# Patient Record
Sex: MALE
[De-identification: names, ages, dates, MRNs, and addresses within clinical notes are randomized per-mention and may not be internally consistent; named-entity substitution may affect disease eponyms.]

---

## 2023-06-14 ENCOUNTER — TRANSCRIPTION ENCOUNTER (OUTPATIENT)
Age: 63
End: 2023-06-14

## 2023-06-14 ENCOUNTER — INPATIENT (INPATIENT)
Facility: HOSPITAL | Age: 63
LOS: 3 days | Discharge: HOME | DRG: 64 | End: 2023-06-18
Attending: NEUROLOGICAL SURGERY | Admitting: NEUROLOGICAL SURGERY
Payer: MEDICARE

## 2023-06-14 VITALS
TEMPERATURE: 98 F | SYSTOLIC BLOOD PRESSURE: 144 MMHG | RESPIRATION RATE: 18 BRPM | OXYGEN SATURATION: 98 % | DIASTOLIC BLOOD PRESSURE: 96 MMHG | WEIGHT: 139.99 LBS | HEART RATE: 89 BPM

## 2023-06-14 DIAGNOSIS — I60.9 NONTRAUMATIC SUBARACHNOID HEMORRHAGE, UNSPECIFIED: ICD-10-CM

## 2023-06-14 LAB
ALBUMIN SERPL ELPH-MCNC: 4.3 G/DL — SIGNIFICANT CHANGE UP (ref 3.3–5.2)
ALP SERPL-CCNC: 88 U/L — SIGNIFICANT CHANGE UP (ref 40–120)
ALT FLD-CCNC: 34 U/L — SIGNIFICANT CHANGE UP
ANION GAP SERPL CALC-SCNC: 11 MMOL/L — SIGNIFICANT CHANGE UP (ref 5–17)
APPEARANCE UR: CLEAR — SIGNIFICANT CHANGE UP
APTT BLD: 33.8 SEC — SIGNIFICANT CHANGE UP (ref 27.5–35.5)
AST SERPL-CCNC: 30 U/L — SIGNIFICANT CHANGE UP
BASOPHILS # BLD AUTO: 0.06 K/UL — SIGNIFICANT CHANGE UP (ref 0–0.2)
BASOPHILS NFR BLD AUTO: 1.1 % — SIGNIFICANT CHANGE UP (ref 0–2)
BILIRUB SERPL-MCNC: 0.3 MG/DL — LOW (ref 0.4–2)
BILIRUB UR-MCNC: NEGATIVE — SIGNIFICANT CHANGE UP
BLD GP AB SCN SERPL QL: SIGNIFICANT CHANGE UP
BUN SERPL-MCNC: 21.1 MG/DL — HIGH (ref 8–20)
CALCIUM SERPL-MCNC: 9.1 MG/DL — SIGNIFICANT CHANGE UP (ref 8.4–10.5)
CHLORIDE SERPL-SCNC: 105 MMOL/L — SIGNIFICANT CHANGE UP (ref 96–108)
CHOLEST SERPL-MCNC: 238 MG/DL — HIGH
CO2 SERPL-SCNC: 26 MMOL/L — SIGNIFICANT CHANGE UP (ref 22–29)
COLOR SPEC: YELLOW — SIGNIFICANT CHANGE UP
CREAT ?TM UR-MCNC: 173 MG/DL — SIGNIFICANT CHANGE UP
CREAT SERPL-MCNC: 1.57 MG/DL — HIGH (ref 0.5–1.3)
DIFF PNL FLD: NEGATIVE — SIGNIFICANT CHANGE UP
EGFR: 49 ML/MIN/1.73M2 — LOW
EOSINOPHIL # BLD AUTO: 0.43 K/UL — SIGNIFICANT CHANGE UP (ref 0–0.5)
EOSINOPHIL NFR BLD AUTO: 7.9 % — HIGH (ref 0–6)
GLUCOSE SERPL-MCNC: 102 MG/DL — HIGH (ref 70–99)
GLUCOSE UR QL: NEGATIVE MG/DL — SIGNIFICANT CHANGE UP
HCT VFR BLD CALC: 45.3 % — SIGNIFICANT CHANGE UP (ref 39–50)
HDLC SERPL-MCNC: 50 MG/DL — SIGNIFICANT CHANGE UP
HGB BLD-MCNC: 15.7 G/DL — SIGNIFICANT CHANGE UP (ref 13–17)
IMM GRANULOCYTES NFR BLD AUTO: 0.6 % — SIGNIFICANT CHANGE UP (ref 0–0.9)
INR BLD: 0.98 RATIO — SIGNIFICANT CHANGE UP (ref 0.88–1.16)
KETONES UR-MCNC: NEGATIVE — SIGNIFICANT CHANGE UP
LEUKOCYTE ESTERASE UR-ACNC: NEGATIVE — SIGNIFICANT CHANGE UP
LIPID PNL WITH DIRECT LDL SERPL: 126 MG/DL — HIGH
LYMPHOCYTES # BLD AUTO: 1.6 K/UL — SIGNIFICANT CHANGE UP (ref 1–3.3)
LYMPHOCYTES # BLD AUTO: 29.5 % — SIGNIFICANT CHANGE UP (ref 13–44)
MAGNESIUM SERPL-MCNC: 2.1 MG/DL — SIGNIFICANT CHANGE UP (ref 1.8–2.6)
MAGNESIUM SERPL-MCNC: 2.2 MG/DL — SIGNIFICANT CHANGE UP (ref 1.6–2.6)
MCHC RBC-ENTMCNC: 30.3 PG — SIGNIFICANT CHANGE UP (ref 27–34)
MCHC RBC-ENTMCNC: 34.7 GM/DL — SIGNIFICANT CHANGE UP (ref 32–36)
MCV RBC AUTO: 87.5 FL — SIGNIFICANT CHANGE UP (ref 80–100)
MONOCYTES # BLD AUTO: 0.44 K/UL — SIGNIFICANT CHANGE UP (ref 0–0.9)
MONOCYTES NFR BLD AUTO: 8.1 % — SIGNIFICANT CHANGE UP (ref 2–14)
NEUTROPHILS # BLD AUTO: 2.86 K/UL — SIGNIFICANT CHANGE UP (ref 1.8–7.4)
NEUTROPHILS NFR BLD AUTO: 52.8 % — SIGNIFICANT CHANGE UP (ref 43–77)
NITRITE UR-MCNC: NEGATIVE — SIGNIFICANT CHANGE UP
NON HDL CHOLESTEROL: 188 MG/DL — HIGH
OSMOLALITY UR: 632 MOSM/KG — SIGNIFICANT CHANGE UP (ref 300–1000)
PH UR: 6 — SIGNIFICANT CHANGE UP (ref 5–8)
PHOSPHATE SERPL-MCNC: 1.8 MG/DL — LOW (ref 2.4–4.7)
PLATELET # BLD AUTO: 215 K/UL — SIGNIFICANT CHANGE UP (ref 150–400)
POTASSIUM SERPL-MCNC: 4.6 MMOL/L — SIGNIFICANT CHANGE UP (ref 3.5–5.3)
POTASSIUM SERPL-SCNC: 4.6 MMOL/L — SIGNIFICANT CHANGE UP (ref 3.5–5.3)
PROT SERPL-MCNC: 7.1 G/DL — SIGNIFICANT CHANGE UP (ref 6.6–8.7)
PROT UR-MCNC: NEGATIVE — SIGNIFICANT CHANGE UP
PROTHROM AB SERPL-ACNC: 11.4 SEC — SIGNIFICANT CHANGE UP (ref 10.5–13.4)
RBC # BLD: 5.18 M/UL — SIGNIFICANT CHANGE UP (ref 4.2–5.8)
RBC # FLD: 11.9 % — SIGNIFICANT CHANGE UP (ref 10.3–14.5)
SODIUM SERPL-SCNC: 142 MMOL/L — SIGNIFICANT CHANGE UP (ref 135–145)
SODIUM UR-SCNC: 110 MMOL/L — SIGNIFICANT CHANGE UP
SP GR SPEC: 1.01 — SIGNIFICANT CHANGE UP (ref 1.01–1.02)
TRIGL SERPL-MCNC: 309 MG/DL — HIGH
UROBILINOGEN FLD QL: NEGATIVE MG/DL — SIGNIFICANT CHANGE UP
WBC # BLD: 5.42 K/UL — SIGNIFICANT CHANGE UP (ref 3.8–10.5)
WBC # FLD AUTO: 5.42 K/UL — SIGNIFICANT CHANGE UP (ref 3.8–10.5)

## 2023-06-14 PROCEDURE — 99291 CRITICAL CARE FIRST HOUR: CPT

## 2023-06-14 PROCEDURE — 76775 US EXAM ABDO BACK WALL LIM: CPT | Mod: 26

## 2023-06-14 PROCEDURE — 99222 1ST HOSP IP/OBS MODERATE 55: CPT

## 2023-06-14 PROCEDURE — 93010 ELECTROCARDIOGRAM REPORT: CPT

## 2023-06-14 PROCEDURE — 70450 CT HEAD/BRAIN W/O DYE: CPT | Mod: 26,MA

## 2023-06-14 PROCEDURE — 71045 X-RAY EXAM CHEST 1 VIEW: CPT | Mod: 26

## 2023-06-14 PROCEDURE — 93306 TTE W/DOPPLER COMPLETE: CPT | Mod: 26

## 2023-06-14 PROCEDURE — 93970 EXTREMITY STUDY: CPT | Mod: 26

## 2023-06-14 RX ORDER — ACETAMINOPHEN 500 MG
1000 TABLET ORAL ONCE
Refills: 0 | Status: COMPLETED | OUTPATIENT
Start: 2023-06-14 | End: 2023-06-14

## 2023-06-14 RX ORDER — NICARDIPINE HYDROCHLORIDE 30 MG/1
5 CAPSULE, EXTENDED RELEASE ORAL
Qty: 40 | Refills: 0 | Status: DISCONTINUED | OUTPATIENT
Start: 2023-06-14 | End: 2023-06-14

## 2023-06-14 RX ORDER — PANTOPRAZOLE SODIUM 20 MG/1
40 TABLET, DELAYED RELEASE ORAL
Refills: 0 | Status: DISCONTINUED | OUTPATIENT
Start: 2023-06-14 | End: 2023-06-15

## 2023-06-14 RX ORDER — HYDRALAZINE HCL 50 MG
10 TABLET ORAL EVERY 4 HOURS
Refills: 0 | Status: DISCONTINUED | OUTPATIENT
Start: 2023-06-14 | End: 2023-06-14

## 2023-06-14 RX ORDER — AMLODIPINE BESYLATE 2.5 MG/1
10 TABLET ORAL DAILY
Refills: 0 | Status: DISCONTINUED | OUTPATIENT
Start: 2023-06-14 | End: 2023-06-15

## 2023-06-14 RX ORDER — SODIUM CHLORIDE 9 MG/ML
1000 INJECTION INTRAMUSCULAR; INTRAVENOUS; SUBCUTANEOUS
Refills: 0 | Status: DISCONTINUED | OUTPATIENT
Start: 2023-06-14 | End: 2023-06-14

## 2023-06-14 RX ORDER — ACETAMINOPHEN 500 MG
650 TABLET ORAL EVERY 6 HOURS
Refills: 0 | Status: DISCONTINUED | OUTPATIENT
Start: 2023-06-14 | End: 2023-06-18

## 2023-06-14 RX ORDER — LEVETIRACETAM 250 MG/1
500 TABLET, FILM COATED ORAL EVERY 12 HOURS
Refills: 0 | Status: DISCONTINUED | OUTPATIENT
Start: 2023-06-14 | End: 2023-06-18

## 2023-06-14 RX ORDER — SODIUM,POTASSIUM PHOSPHATES 278-250MG
1 POWDER IN PACKET (EA) ORAL ONCE
Refills: 0 | Status: COMPLETED | OUTPATIENT
Start: 2023-06-14 | End: 2023-06-14

## 2023-06-14 RX ORDER — SODIUM CHLORIDE 9 MG/ML
1000 INJECTION, SOLUTION INTRAVENOUS
Refills: 0 | Status: DISCONTINUED | OUTPATIENT
Start: 2023-06-14 | End: 2023-06-15

## 2023-06-14 RX ORDER — CHLORHEXIDINE GLUCONATE 213 G/1000ML
1 SOLUTION TOPICAL DAILY
Refills: 0 | Status: DISCONTINUED | OUTPATIENT
Start: 2023-06-14 | End: 2023-06-18

## 2023-06-14 RX ORDER — LABETALOL HCL 100 MG
10 TABLET ORAL
Refills: 0 | Status: DISCONTINUED | OUTPATIENT
Start: 2023-06-14 | End: 2023-06-18

## 2023-06-14 RX ORDER — HYDRALAZINE HCL 50 MG
10 TABLET ORAL
Refills: 0 | Status: DISCONTINUED | OUTPATIENT
Start: 2023-06-14 | End: 2023-06-16

## 2023-06-14 RX ORDER — ONDANSETRON 8 MG/1
4 TABLET, FILM COATED ORAL EVERY 6 HOURS
Refills: 0 | Status: DISCONTINUED | OUTPATIENT
Start: 2023-06-14 | End: 2023-06-16

## 2023-06-14 RX ORDER — NICARDIPINE HYDROCHLORIDE 30 MG/1
5 CAPSULE, EXTENDED RELEASE ORAL
Qty: 40 | Refills: 0 | Status: DISCONTINUED | OUTPATIENT
Start: 2023-06-14 | End: 2023-06-17

## 2023-06-14 RX ADMIN — Medication 10 MILLIGRAM(S): at 16:56

## 2023-06-14 RX ADMIN — SODIUM CHLORIDE 50 MILLILITER(S): 9 INJECTION, SOLUTION INTRAVENOUS at 19:53

## 2023-06-14 RX ADMIN — LEVETIRACETAM 400 MILLIGRAM(S): 250 TABLET, FILM COATED ORAL at 18:01

## 2023-06-14 RX ADMIN — Medication 1000 MILLIGRAM(S): at 18:13

## 2023-06-14 RX ADMIN — CHLORHEXIDINE GLUCONATE 1 APPLICATION(S): 213 SOLUTION TOPICAL at 18:03

## 2023-06-14 RX ADMIN — SODIUM CHLORIDE 50 MILLILITER(S): 9 INJECTION INTRAMUSCULAR; INTRAVENOUS; SUBCUTANEOUS at 18:01

## 2023-06-14 RX ADMIN — Medication 1 TABLET(S): at 21:47

## 2023-06-14 RX ADMIN — NICARDIPINE HYDROCHLORIDE 25 MG/HR: 30 CAPSULE, EXTENDED RELEASE ORAL at 15:06

## 2023-06-14 RX ADMIN — Medication 400 MILLIGRAM(S): at 17:15

## 2023-06-14 RX ADMIN — NICARDIPINE HYDROCHLORIDE 25 MG/HR: 30 CAPSULE, EXTENDED RELEASE ORAL at 18:01

## 2023-06-14 NOTE — ED PROVIDER NOTE - OBJECTIVE STATEMENT
62 yo male with no known medical hx presents to the ED for headache. Patient began having right frontal region 5 days ago. Denies N/V, weakness, loss of sensation, loss of balance. Patient was sent for MRI today out patient and report states he has a subarachnoid hemorrhage.

## 2023-06-14 NOTE — H&P ADULT - NSHPREVIEWOFSYSTEMS_GEN_ALL_CORE
Constitutional:, no fevers, chills, or new weakness  Eyes: No double vision, no change in visual acuity, no blurry vision, no occular discharge  Neurologic: + headache, No new weakness, no seizure reported  Ears, nose, mouth throat:  No ottorrhea. No change in hearing, No anosmia, No oral lesions, No sore throat  Cardiovascular: No palpitations, no chest pain, no nocturnal or positional dyspnea.  Respiratory: No shortness of breath, No Cough  Gastrointestinal: No change in bowel habits, no change in appetite  Genitourinary: No Frequency, No Dysuria, no Hematuria  Musculoskeletal: No muscle wasting, No new weakness  Psych: No changes in mood, Denies drug use, Denies history of psyciatric illness  Integumentary: Denies new skin lesions  Endocrine: Denies history of DM, denies history of thyroid disease, No heat or cold intolerance  Heme/Lymph: No use of antiplatelet/anticoagulant  Allergic / Immune: No active allergies unless otherwise specified in medical chart    All other systems reviewed and are negative

## 2023-06-14 NOTE — H&P ADULT - ASSESSMENT
63M with atraumatic SAH     Plan   - Admit to stepdown   - q2 neuro checks   - CTA/CTV head and neck   - Possible angiogram   - -140   - Okay for diet once passes dysphagia   - SCDS only for dvt ppx

## 2023-06-14 NOTE — CONSULT NOTE ADULT - ASSESSMENT
63M has had 1 week of headaches. The headaches are constant, they are worse when he is laying down. His PCP sent him for an MRI, MRA for a headache work up which revealed a right occipital region hemorrhage with surrounding vasogenic edema and subarachnoid blood. Pt directed to ED for further eval. ED got CT scan that shows right parietal SAH. HH1, MF 0. MRS 0 (14 Jun 2023 15:23) In Ed placed on nicardipine.     Neuro:   -q1 neurochecks  -Keppra 500 BID  - [facility]   -Preop for angio tomorrow  - pending CTA, CTV   - 6 hours Repeat ct head.   BP: 100-140   - cardene   - PRN: labetolol    Resp: RA   satting well     GI:   regular diet  - Protonix 40,   PRN: zofran     :   - voiding   NS @ 50  - Creatinine increasing   - renal US     Heme:   SCD    ID:   afebrile  63M has had 1 week of headaches. The headaches are constant, they are worse when he is laying down. His PCP sent him for an MRI, MRA for a headache work up which revealed a right occipital region hemorrhage with surrounding vasogenic edema and subarachnoid blood. Pt directed to ED for further eval. ED got CT scan that shows right parietal SAH. HH1, MF 0. MRS 0 (14 Jun 2023 15:23) In Ed placed on nicardipine.     Neuro:   -q1 neurochecks/q1 vital checks.   -Keppra 500 BID  - admit to [facility]   -Preop for angio tomorrow  - pending CTA, CTV   - 6 hours Repeat ct head.   BP: 100-140   - cardene   - PRN: labetolol    Resp: RA   satting well     GI:   regular diet  - Protonix 40: for dypepsia   PRN: zofran     :   - voiding   NS @ 50  - Creatinine increasing   - renal US   -UA , Ur Creatinine, Urine sodium, Ur urea    Heme:   SCD    ID:   afebrile

## 2023-06-14 NOTE — H&P ADULT - HISTORY OF PRESENT ILLNESS
63M has had 1 week of headaches. The headaches are constant, they are worse when he is laying down. His PCP sent him for an MRI, MRA for a headache work up which revealed a right occipital region hemorrhage with surrounding vasogenic edema and subarachnoid blood. Pt directed to ED for further eval. ED got CT scan that shows right parietal SAH. HH1, MF 0. MRS 0 63M has had 1 week of headaches. The headaches are constant, they are worse when he is laying down. His PCP sent him for an MRI, MRA for a headache work up which revealed a right occipital region hemorrhage with surrounding vasogenic edema and subarachnoid blood. Pt directed to ED for further eval. ED got CT scan that shows right parietal SAH. HH1, MF 0. MRS 0. ICH 0

## 2023-06-14 NOTE — ED ADULT NURSE NOTE - OBJECTIVE STATEMENT
patient present to ED reporting a headache since Friday 6/9; daughter who is patients PCP ordered an MRI & MRA. Test was completed today 6/14 and after daughter received results sent patient to ED for what she reported to be a bleed. patient arrived to ED alert and oriented no neuro defects noted, conversational and calm. NSR on cardiac.

## 2023-06-14 NOTE — ED ADULT TRIAGE NOTE - NS ED NURSE AMBULANCES
[facility]

## 2023-06-14 NOTE — ED ADULT TRIAGE NOTE - CHIEF COMPLAINT QUOTE
Pt presents to ED for occipital bleed after oupt MRI. R sided HA x 1 week. Neuro intact.  MD Pitt at bedside.

## 2023-06-14 NOTE — CONSULT NOTE ADULT - CRITICAL CARE ATTENDING COMMENT
64 yo M with HTN, HLD, presented with R subacute infarct with associated cortical petechial hemorrhage.  Hypertensive urgency that required initiation of Cardene ggt.  CHANO vs CKD.    Plan:  admitted to NSICU, neurochecks  CTA/CTV, plan for angio in am  maintain -160, Cardene ggt for now  cont IV hydration with Plasmalyte  renal sono, urine studies (AU, ur osm, Na, urea)  stroke core meaures  rest as above      Pt is critically ill and at high risk of rapid deterioration/death due to abovementioned conditions.   Still requires critical care interventions - ongoing frequent evaluations, interventions and management adjustment by the Attending and ICU team, - and included review of relevant history, clinical examination, review of data and images, discussion of treatment with the multidisciplinary team and any consultants involved in this patient’s care as well as family discussion. 62 yo M with HTN, HLD, presented with R subacute infarct with associated cortical petechial hemorrhage.  Hypertensive urgency that required initiation of Cardene ggt.  CHANO vs CKD.    Plan:  admitted to NSICU, neurochecks  CTA/CTV, plan for angio in am  maintain -160, Cardene ggt for now  cont IV hydration with Plasmalyte  renal sono, urine studies (AU, ur osm, Na, urea)  stroke core meaures  rest as above      Pt is critically ill and at high risk of rapid deterioration/death due to abovementioned conditions.   Still requires critical care interventions - ongoing frequent evaluations, interventions and management adjustment by the Attending and ICU team, - and included review of relevant history, clinical examination, review of data and images, discussion of treatment with the multidisciplinary team and any consultants involved in this patient’s care as well as family discussion.

## 2023-06-14 NOTE — H&P ADULT - NSHPPHYSICALEXAM_GEN_ALL_CORE
Physical Exam:     Constitutional:  NAD  	Neuro  	* Mental Status:  GCS 15: E4, V5, M6. Awake, alert, oriented to conversation. No aphasia   	* Cranial Nerves: Cnii-Cnxii grossly intact. PERRL, EOMI, tongue midline, no gaze deviation. No field cut  	* Motor: RUE 5/5, LUE 5/5, RLE 5/5, LLE 5/5, no drift or dysmetria  	* Sensory: Sensation intact to light touch  	* Reflexes: Not assessed  	* Gait: Not assessed    	Cardiovascular:   Regular rate and rhythm.  	Eyes: See neurologic examination with detailed examination of eyes.  	ENT: No JVD, Trachea Midline.  	Respiratory: Clear to auscultation.  	Gastrointestinal: Soft, nontender, nondistended.  	Genitourinary: Male  	Musculoskeletal: No muscle wasting noted, (See neuorlogic assessment for full muscle strength assessment) No pretibial edema appreciated, no appreciable calf tenderness.  	Skin:  no skin breakdown  	Musculoskeletal: See detailed muscle strength examination, listed under neurologic examination.  Hematologic / Lymph / Immunologic: No bleeding from IV sites or wounds, No lymphadenopathy, No HIves or allergic type skin lesions

## 2023-06-14 NOTE — CONSULT NOTE ADULT - SUBJECTIVE AND OBJECTIVE BOX
[Dear  ___] : Dear  [unfilled], [Consult Letter:] : I had the pleasure of evaluating your patient, [unfilled]. [FreeTextEntry1] : Below is my note regarding the office visit today.\par \par Thank you so very much for allowing me to participate in LIBBY's care.  Please don't hesitate to call me should any questions or issues arise regarding LIBBY.\par \par Sincerely, \par \par Sameer\par \par Sameer Jansen MD, FACS, FSPU\par Chief, Pediatric Urology\par Professor of Urology and Pediatrics\par [employer]  HPI:  HPI:  63M has had 1 week of headaches. The headaches are constant, they are worse when he is laying down. His PCP sent him for an MRI, MRA for a headache work up which revealed a right occipital region hemorrhage with surrounding vasogenic edema and subarachnoid blood. Pt directed to ED for further eval. ED got CT scan that shows right parietal SAH. HH1, MF 0. MRS 0 (14 Jun 2023 15:23)      Vital Signs Last 24 Hrs  T(C): 36.7 (14 Jun 2023 14:44), Max: 36.8 (14 Jun 2023 14:11)  T(F): 98 (14 Jun 2023 14:44), Max: 98.3 (14 Jun 2023 14:11)  HR: 60 (14 Jun 2023 17:11) (60 - 90)  BP: 118/62 (14 Jun 2023 17:11) (118/62 - 185/90)  BP(mean): --  RR: 16 (14 Jun 2023 17:11) (16 - 18)  SpO2: 93% (14 Jun 2023 17:11) (93% - 98%)    Parameters below as of 14 Jun 2023 17:01  Patient On (Oxygen Delivery Method): room air      Physical Exam: Physical Exam:     Constitutional:  NAD  	Neuro  	* Mental Status:  GCS 15: E4, V5, M6. Awake, alert, oriented to conversation. No aphasia   	* Cranial Nerves: Cnii-Cnxii grossly intact. PERRL, EOMI, tongue midline, no gaze deviation. No field cut  	* Motor: RUE 5/5, LUE 5/5, RLE 5/5, LLE 5/5, no drift or dysmetria  	* Sensory: Sensation intact to light touch  	* Reflexes: Not assessed  	* Gait: Not assessed    	Cardiovascular:   Regular rate and rhythm.  	Eyes: See neurologic examination with detailed examination of eyes.  	ENT: No JVD, Trachea Midline.  	Respiratory: Clear to auscultation.  	Gastrointestinal: Soft, nontender, nondistended.  	Genitourinary: Male  	Musculoskeletal: No muscle wasting noted, (See neuorlogic assessment for full muscle strength assessment) No pretibial edema appreciated, no appreciable calf tenderness.  	Skin:  no skin breakdown  	Musculoskeletal: See detailed muscle strength examination, listed under neurologic examination.  Hematologic / Lymph / Immunologic: No bleeding from IV sites or wounds, No lymphadenopathy, No HIves or allergic type skin lesions    LABS:                        15.7   5.42  )-----------( 215      ( 14 Jun 2023 14:49 )             45.3     06-14    142  |  105  |  21.1<H>  ----------------------------<  102<H>  4.6   |  26.0  |  1.57<H>    Ca    9.1      14 Jun 2023 14:49  Phos  1.8     06-14  Mg     2.1     06-14    TPro  7.1  /  Alb  4.3  /  TBili  0.3<L>  /  DBili  x   /  AST  30  /  ALT  34  /  AlkPhos  88  06-14    PT/INR - ( 14 Jun 2023 14:49 )   PT: 11.4 sec;   INR: 0.98 ratio         PTT - ( 14 Jun 2023 14:49 )  PTT:33.8 sec      06-14 @ 07:01  -  06-14 @ 18:14  --------------------------------------------------------  IN: 262.5 mL / OUT: 0 mL / NET: 262.5 mL        RADIOLOGY & ADDITIONAL TESTS:    < from: CT Head No Cont (06.14.23 @ 14:25) >    IMPRESSION:  Subtle hypoattenuation in the right parieto-occipital lobe with   superimposed gyriform high attenuation, the combination of findings are   most concerningfor a subacute infarct with associated cortical petechial   hemorrhage. Correlate with MRI. No hydrocephalus, significant mass   effect, midline shift, or herniation.    --- End of Report ---    < end of copied text >   HPI:  63M has had 1 week of headaches. The headaches are constant, they are worse when he is laying down. His PCP sent him for an MRI, MRA for a headache work up which revealed a right occipital region hemorrhage with surrounding vasogenic edema and subarachnoid blood. Pt directed to ED for further eval. ED got CT scan that shows right parietal SAH. HH1, MF 0. MRS 0 (14 Jun 2023 15:23)      Vital Signs Last 24 Hrs  T(C): 36.7 (14 Jun 2023 14:44), Max: 36.8 (14 Jun 2023 14:11)  T(F): 98 (14 Jun 2023 14:44), Max: 98.3 (14 Jun 2023 14:11)  HR: 60 (14 Jun 2023 17:11) (60 - 90)  BP: 118/62 (14 Jun 2023 17:11) (118/62 - 185/90)  BP(mean): --  RR: 16 (14 Jun 2023 17:11) (16 - 18)  SpO2: 93% (14 Jun 2023 17:11) (93% - 98%)    Parameters below as of 14 Jun 2023 17:01  Patient On (Oxygen Delivery Method): room air      Physical Exam: Physical Exam:   Constitutional:  NAD  	Neuro  	* Mental Status:  GCS 15: E4, V5, M6. Awake, alert, oriented to conversation. No aphasia   	* Cranial Nerves: Cnii-Cnxii grossly intact. PERRL, EOMI, tongue midline, no gaze deviation. No field cut  	* Motor: RUE 5/5, LUE 5/5, RLE 5/5, LLE 5/5, no drift or dysmetria  	* Sensory: Sensation intact to light touch  	* Reflexes: Not assessed  	* Gait: Not assessed    	Cardiovascular:   Regular rate and rhythm.  	Eyes: See neurologic examination with detailed examination of eyes.  	ENT: No JVD, Trachea Midline.  	Respiratory: Clear to auscultation.  	Gastrointestinal: Soft, nontender, nondistended.  	Genitourinary: Male  	Musculoskeletal: No muscle wasting noted, (See neuorlogic assessment for full muscle strength assessment) No pretibial edema appreciated, no appreciable calf tenderness.  	Skin:  no skin breakdown  	Musculoskeletal: See detailed muscle strength examination, listed under neurologic examination.  Hematologic / Lymph / Immunologic: No bleeding from IV sites or wounds, No lymphadenopathy, No HIves or allergic type skin lesions    LABS:                        15.7   5.42  )-----------( 215      ( 14 Jun 2023 14:49 )             45.3     06-14    142  |  105  |  21.1<H>  ----------------------------<  102<H>  4.6   |  26.0  |  1.57<H>    Ca    9.1      14 Jun 2023 14:49  Phos  1.8     06-14  Mg     2.1     06-14    TPro  7.1  /  Alb  4.3  /  TBili  0.3<L>  /  DBili  x   /  AST  30  /  ALT  34  /  AlkPhos  88  06-14    PT/INR - ( 14 Jun 2023 14:49 )   PT: 11.4 sec;   INR: 0.98 ratio         PTT - ( 14 Jun 2023 14:49 )  PTT:33.8 sec      06-14 @ 07:01  -  06-14 @ 18:14  --------------------------------------------------------  IN: 262.5 mL / OUT: 0 mL / NET: 262.5 mL        RADIOLOGY & ADDITIONAL TESTS:    < from: CT Head No Cont (06.14.23 @ 14:25) >    IMPRESSION:  Subtle hypoattenuation in the right parieto-occipital lobe with   superimposed gyriform high attenuation, the combination of findings are   most concerningfor a subacute infarct with associated cortical petechial   hemorrhage. Correlate with MRI. No hydrocephalus, significant mass   effect, midline shift, or herniation.    --- End of Report ---    < end of copied text >

## 2023-06-14 NOTE — ED ADULT NURSE NOTE - NSFALLUNIVINTERV_ED_ALL_ED
Bed/Stretcher in lowest position, wheels locked, appropriate side rails in place/Call bell, personal items and telephone in reach/Instruct patient to call for assistance before getting out of bed/chair/stretcher/Non-slip footwear applied when patient is off stretcher/[place] to call system/Physically safe environment - no spills, clutter or unnecessary equipment/Purposeful proactive rounding/Room/bathroom lighting operational, light cord in reach Bed/Stretcher in lowest position, wheels locked, appropriate side rails in place/Call bell, personal items and telephone in reach/Instruct patient to call for assistance before getting out of bed/chair/stretcher/Non-slip footwear applied when patient is off stretcher/[place] to call system/Physically safe environment - no spills, clutter or unnecessary equipment/Purposeful proactive rounding/Room/bathroom lighting operational, light cord in reach Bed/Stretcher in lowest position, wheels locked, appropriate side rails in place/Call bell, personal items and telephone in reach/Instruct patient to call for assistance before getting out of bed/chair/stretcher/Non-slip footwear applied when patient is off stretcher/[place] to call system/Physically safe environment - no spills, clutter or unnecessary equipment/Purposeful proactive rounding/Room/bathroom lighting operational, light cord in reach

## 2023-06-14 NOTE — H&P ADULT - NSHPLABSRESULTS_GEN_ALL_CORE
< from: CT Head No Cont (06.14.23 @ 14:25) >    IMPRESSION:  Subtle hypoattenuation in the right parieto-occipital lobe with   superimposed gyriform high attenuation, the combination of findings are   most concerningfor a subacute infarct with associated cortical petechial   hemorrhage. Correlate with MRI. No hydrocephalus, significant mass   effect, midline shift, or herniation.    < end of copied text >

## 2023-06-14 NOTE — H&P ADULT - NSHPSOCIALHISTORY_GEN_ALL_CORE
Works as a , lives at home with family  He is independent in ADLs   No tobacco use, occasional alcohol, no drug use

## 2023-06-14 NOTE — ED PROVIDER NOTE - CLINICAL SUMMARY MEDICAL DECISION MAKING FREE TEXT BOX
Patient presents with known subarachnoid hemorrhage. Patient sent for priority Ct head here. Neurosurgery consulted. Currently Neuro intact, vitally stable. BP elevated with 185 SBP, placed patient on nicardipine drip.

## 2023-06-15 ENCOUNTER — APPOINTMENT (OUTPATIENT)
Dept: NEUROLOGY | Facility: HOSPITAL | Age: 63
End: 2023-06-15

## 2023-06-15 PROBLEM — Z00.00 ENCOUNTER FOR PREVENTIVE HEALTH EXAMINATION: Status: ACTIVE | Noted: 2023-06-15

## 2023-06-15 LAB
A1C WITH ESTIMATED AVERAGE GLUCOSE RESULT: 5.6 % — SIGNIFICANT CHANGE UP (ref 4–5.6)
ABO RH CONFIRMATION: SIGNIFICANT CHANGE UP
ANION GAP SERPL CALC-SCNC: 12 MMOL/L — SIGNIFICANT CHANGE UP (ref 5–17)
BUN SERPL-MCNC: 20.2 MG/DL — HIGH (ref 8–20)
CALCIUM SERPL-MCNC: 8.4 MG/DL — SIGNIFICANT CHANGE UP (ref 8.4–10.5)
CHLORIDE SERPL-SCNC: 105 MMOL/L — SIGNIFICANT CHANGE UP (ref 96–108)
CHOLEST SERPL-MCNC: 183 MG/DL — SIGNIFICANT CHANGE UP
CO2 SERPL-SCNC: 24 MMOL/L — SIGNIFICANT CHANGE UP (ref 22–29)
CREAT SERPL-MCNC: 1.51 MG/DL — HIGH (ref 0.5–1.3)
EGFR: 51 ML/MIN/1.73M2 — LOW
ESTIMATED AVERAGE GLUCOSE: 114 MG/DL — SIGNIFICANT CHANGE UP (ref 68–114)
GLUCOSE SERPL-MCNC: 111 MG/DL — HIGH (ref 70–99)
HCT VFR BLD CALC: 40.1 % — SIGNIFICANT CHANGE UP (ref 39–50)
HCV AB S/CO SERPL IA: 0.17 S/CO — SIGNIFICANT CHANGE UP (ref 0–0.99)
HCV AB SERPL-IMP: SIGNIFICANT CHANGE UP
HDLC SERPL-MCNC: 44 MG/DL — SIGNIFICANT CHANGE UP
HGB BLD-MCNC: 13.9 G/DL — SIGNIFICANT CHANGE UP (ref 13–17)
INR BLD: 0.99 RATIO — SIGNIFICANT CHANGE UP (ref 0.88–1.16)
LIPID PNL WITH DIRECT LDL SERPL: 113 MG/DL — HIGH
MAGNESIUM SERPL-MCNC: 2.1 MG/DL — SIGNIFICANT CHANGE UP (ref 1.6–2.6)
MCHC RBC-ENTMCNC: 30.2 PG — SIGNIFICANT CHANGE UP (ref 27–34)
MCHC RBC-ENTMCNC: 34.7 GM/DL — SIGNIFICANT CHANGE UP (ref 32–36)
MCV RBC AUTO: 87.2 FL — SIGNIFICANT CHANGE UP (ref 80–100)
MRSA PCR RESULT.: SIGNIFICANT CHANGE UP
NON HDL CHOLESTEROL: 139 MG/DL — HIGH
PHOSPHATE SERPL-MCNC: 3.9 MG/DL — SIGNIFICANT CHANGE UP (ref 2.4–4.7)
PLATELET # BLD AUTO: 197 K/UL — SIGNIFICANT CHANGE UP (ref 150–400)
POTASSIUM SERPL-MCNC: 4 MMOL/L — SIGNIFICANT CHANGE UP (ref 3.5–5.3)
POTASSIUM SERPL-SCNC: 4 MMOL/L — SIGNIFICANT CHANGE UP (ref 3.5–5.3)
PROTHROM AB SERPL-ACNC: 11.5 SEC — SIGNIFICANT CHANGE UP (ref 10.5–13.4)
RBC # BLD: 4.6 M/UL — SIGNIFICANT CHANGE UP (ref 4.2–5.8)
RBC # FLD: 11.9 % — SIGNIFICANT CHANGE UP (ref 10.3–14.5)
S AUREUS DNA NOSE QL NAA+PROBE: DETECTED
SODIUM SERPL-SCNC: 141 MMOL/L — SIGNIFICANT CHANGE UP (ref 135–145)
TRIGL SERPL-MCNC: 131 MG/DL — SIGNIFICANT CHANGE UP
TSH SERPL-MCNC: 4.24 UIU/ML — HIGH (ref 0.27–4.2)
UUN UR-MCNC: 556 MG/DL — SIGNIFICANT CHANGE UP
WBC # BLD: 6.2 K/UL — SIGNIFICANT CHANGE UP (ref 3.8–10.5)
WBC # FLD AUTO: 6.2 K/UL — SIGNIFICANT CHANGE UP (ref 3.8–10.5)

## 2023-06-15 PROCEDURE — 36227 PLACE CATH XTRNL CAROTID: CPT | Mod: 50

## 2023-06-15 PROCEDURE — 36224 PLACE CATH CAROTD ART: CPT | Mod: 50

## 2023-06-15 PROCEDURE — 36226 PLACE CATH VERTEBRAL ART: CPT | Mod: 50

## 2023-06-15 PROCEDURE — 70496 CT ANGIOGRAPHY HEAD: CPT | Mod: 26

## 2023-06-15 PROCEDURE — 99291 CRITICAL CARE FIRST HOUR: CPT

## 2023-06-15 PROCEDURE — 76377 3D RENDER W/INTRP POSTPROCES: CPT | Mod: 26

## 2023-06-15 PROCEDURE — 70498 CT ANGIOGRAPHY NECK: CPT | Mod: 26

## 2023-06-15 RX ORDER — NIMODIPINE 60 MG/10ML
60 SOLUTION ORAL EVERY 4 HOURS
Refills: 0 | Status: DISCONTINUED | OUTPATIENT
Start: 2023-06-15 | End: 2023-06-16

## 2023-06-15 RX ORDER — MUPIROCIN 20 MG/G
1 OINTMENT TOPICAL
Refills: 0 | Status: DISCONTINUED | OUTPATIENT
Start: 2023-06-15 | End: 2023-06-18

## 2023-06-15 RX ORDER — SODIUM CHLORIDE 9 MG/ML
1000 INJECTION, SOLUTION INTRAVENOUS
Refills: 0 | Status: DISCONTINUED | OUTPATIENT
Start: 2023-06-15 | End: 2023-06-16

## 2023-06-15 RX ORDER — SODIUM CHLORIDE 9 MG/ML
1000 INJECTION INTRAMUSCULAR; INTRAVENOUS; SUBCUTANEOUS
Refills: 0 | Status: DISCONTINUED | OUTPATIENT
Start: 2023-06-15 | End: 2023-06-15

## 2023-06-15 RX ADMIN — SODIUM CHLORIDE 75 MILLILITER(S): 9 INJECTION, SOLUTION INTRAVENOUS at 17:00

## 2023-06-15 RX ADMIN — NIMODIPINE 60 MILLIGRAM(S): 60 SOLUTION ORAL at 14:28

## 2023-06-15 RX ADMIN — LEVETIRACETAM 400 MILLIGRAM(S): 250 TABLET, FILM COATED ORAL at 17:00

## 2023-06-15 RX ADMIN — NIMODIPINE 60 MILLIGRAM(S): 60 SOLUTION ORAL at 17:00

## 2023-06-15 RX ADMIN — LEVETIRACETAM 400 MILLIGRAM(S): 250 TABLET, FILM COATED ORAL at 06:40

## 2023-06-15 NOTE — PROGRESS NOTE ADULT - SUBJECTIVE AND OBJECTIVE BOX
Chief complaint:   Patient is a 63y old  Male who presents with a chief complaint of SAH (15 Juan 2023 00:42)    HPI:  63M has had 1 week of headaches. The headaches are constant, they are worse when he is laying down. His PCP sent him for an MRI, MRA for a headache work up which revealed a right occipital region hemorrhage with surrounding vasogenic edema and subarachnoid blood. Pt directed to ED for further eval. ED got CT scan that shows right parietal SAH. HH1, MF 0. MRS 0 (14 Jun 2023 15:23)        24hr EVENTS:      ROS: [ ]  Unable to assess due to mental status   All other systems negative    -----------------------------------------------------------------------------------------------------------------------------------------------------------------------------------  ICU Vital Signs Last 24 Hrs  T(C): 36.6 (15 Juan 2023 07:42), Max: 36.8 (14 Jun 2023 14:11)  T(F): 97.9 (15 Juan 2023 07:42), Max: 98.3 (14 Jun 2023 14:11)  HR: 48 (15 Juan 2023 07:00) (47 - 90)  BP: 125/73 (15 Juan 2023 07:00) (110/69 - 185/90)  BP(mean): 90 (15 Juan 2023 07:00) (67 - 95)  ABP: --  ABP(mean): --  RR: 17 (15 Juan 2023 07:00) (10 - 31)  SpO2: 99% (15 Juan 2023 07:00) (93% - 100%)    O2 Parameters below as of 15 Juan 2023 04:00  Patient On (Oxygen Delivery Method): room air            I&O's Summary    14 Jun 2023 07:01  -  15 Juan 2023 07:00  --------------------------------------------------------  IN: 1290 mL / OUT: 875 mL / NET: 415 mL        MEDICATIONS  (STANDING):  chlorhexidine 2% Cloths 1 Application(s) Topical daily  levETIRAcetam  IVPB 500 milliGRAM(s) IV Intermittent every 12 hours  multiple electrolytes Injection Type 1 1000 milliLiter(s) (50 mL/Hr) IV Continuous <Continuous>  niCARdipine Infusion 5 mG/Hr (25 mL/Hr) IV Continuous <Continuous>      RESPIRATORY:        IMAGING:   Recent imaging studies were reviewed.    LAB RESULTS:                          13.9   6.20  )-----------( 197      ( 15 Juan 2023 03:15 )             40.1       PT/INR - ( 15 Juan 2023 03:15 )   PT: 11.5 sec;   INR: 0.99 ratio         PTT - ( 14 Jun 2023 14:49 )  PTT:33.8 sec    06-15    141  |  105  |  20.2<H>  ----------------------------<  111<H>  4.0   |  24.0  |  1.51<H>    Ca    8.4      15 Juan 2023 03:15  Phos  3.9     06-15  Mg     2.1     06-15    TPro  7.1  /  Alb  4.3  /  TBili  0.3<L>  /  DBili  x   /  AST  30  /  ALT  34  /  AlkPhos  88  06-14        -----------------------------------------------------------------------------------------------------------------------------------------------------------------------------------    PHYSICAL EXAM:  General: Calm, laying in bed  HEENT: MMM  Neuro:  -Mental status- No acute distress, AOx3, conversational, following commands  -CN- PERRL 3mm, EOMI, tongue midline, face symmetric  -Motor- full strength in all ext  -Sensation- intact to LT   -Coordination- no dysmetria noted    CV:   Pulm: Clear to auscultation  Abd: Soft, nontender, nondistended  Ext: No edema  Skin: warm, dry Chief complaint:   Patient is a 63y old  Male who presents with a chief complaint of SAH (15 Juan 2023 00:42)    HPI:  63M has had 1 week of headaches. The headaches are constant, they are worse when he is laying down. His PCP sent him for an MRI, MRA for a headache work up which revealed a right occipital region hemorrhage with surrounding vasogenic edema and subarachnoid blood. Pt directed to ED for further eval. ED got CT scan that shows right parietal SAH. HH1, MF 0. MRS 0 (14 Jun 2023 15:23)    24hr EVENTS:  resolved headache  off cardene    ROS: [ x]  Unable to assess due to mental status   All other systems negative    -----------------------------------------------------------------------------------------------------------------------------------------------------------------------------------  ICU Vital Signs Last 24 Hrs  T(C): 36.6 (15 Juan 2023 07:42), Max: 36.8 (14 Jun 2023 14:11)  T(F): 97.9 (15 Juan 2023 07:42), Max: 98.3 (14 Jun 2023 14:11)  HR: 48 (15 Juan 2023 07:00) (47 - 90)  BP: 125/73 (15 Juan 2023 07:00) (110/69 - 185/90)  BP(mean): 90 (15 Juan 2023 07:00) (67 - 95)  ABP: --  ABP(mean): --  RR: 17 (15 Juan 2023 07:00) (10 - 31)  SpO2: 99% (15 Juan 2023 07:00) (93% - 100%)    O2 Parameters below as of 15 Juan 2023 04:00  Patient On (Oxygen Delivery Method): room air        I&O's Summary    14 Jun 2023 07:01  -  15 Juan 2023 07:00  --------------------------------------------------------  IN: 1290 mL / OUT: 875 mL / NET: 415 mL        MEDICATIONS  (STANDING):  chlorhexidine 2% Cloths 1 Application(s) Topical daily  levETIRAcetam  IVPB 500 milliGRAM(s) IV Intermittent every 12 hours  multiple electrolytes Injection Type 1 1000 milliLiter(s) (50 mL/Hr) IV Continuous <Continuous>  niCARdipine Infusion 5 mG/Hr (25 mL/Hr) IV Continuous <Continuous>      IMAGING:   Recent imaging studies were reviewed.    LAB RESULTS:                          13.9   6.20  )-----------( 197      ( 15 Juan 2023 03:15 )             40.1       PT/INR - ( 15 Juan 2023 03:15 )   PT: 11.5 sec;   INR: 0.99 ratio         PTT - ( 14 Jun 2023 14:49 )  PTT:33.8 sec    06-15    141  |  105  |  20.2<H>  ----------------------------<  111<H>  4.0   |  24.0  |  1.51<H>    Ca    8.4      15 Juan 2023 03:15  Phos  3.9     06-15  Mg     2.1     06-15    TPro  7.1  /  Alb  4.3  /  TBili  0.3<L>  /  DBili  x   /  AST  30  /  ALT  34  /  AlkPhos  88  06-14    -----------------------------------------------------------------------------------------------------------------------------------------------------------------------------------    PHYSICAL EXAM:  General: Calm, laying in bed  HEENT: MMM  Neuro:  -Mental status- No acute distress, AOx3, conversational, following commands  -CN- PERRL 3mm, EOMI, tongue midline, face symmetric  -Motor- full strength in all ext  -Sensation- intact to LT   -Coordination- no dysmetria noted    CV: RRR  Pulm: Clear to auscultation  Abd: Soft, nontender, nondistended  Ext: No edema  Skin: warm, dry

## 2023-06-15 NOTE — PATIENT PROFILE ADULT - NSPROPTRIGHTCAREGIVER_GEN_A_NUR
Initial Anesthesia Post-op Note    Patient: Blaze Brito  Procedure(s) Performed: RIGHT TOTAL KNEE ARTHROPLASTY - RIGHT  Anesthesia type: Spinal    Vitals Value Taken Time   Temp 97.6 11/29/22 1023   Pulse 65 11/29/22 1020   Resp 16 11/29/22 1020   SpO2 100 % 11/29/22 1020   /67 11/29/22 1020     Report given to RN. Vital signs noted. Patient stable. Care transferred to RN.  Comments:              yes

## 2023-06-15 NOTE — PATIENT PROFILE ADULT - FUNCTIONAL ASSESSMENT - BASIC MOBILITY 6.
4-calculated by average/Not able to assess (calculate score using [facility] averaging method)  4-calculated by average/Not able to assess (calculate score using [facility] averaging method)  4-calculated by average/Not able to assess (calculate score using [facility] averaging method)

## 2023-06-15 NOTE — PATIENT PROFILE ADULT - NSPROGENSOURCEINFO_GEN_A_NUR
Discharge Planning Assessment  [facility]     Patient Name: Isadora Powell  MRN: [id number]  Today's Date: 4/14/2017    Admit Date: 4/13/2017          Discharge Needs Assessment     None            Discharge Plan       04/14/17 1117    Case Management/Social Work Plan    Plan [place]    Patient/Family In Agreement With Plan yes    Additional Comments Pt's IMM letter signed on 4/14 and confirmed facesheet as correct.  Spoke with pt and pt's daughter Jacki Loza [phone] (by phone)  who state that pt is from [facility] and the plan will be to return there upon D/C.    Call placed to Bisi Rousseau [phone] who stated that pt will be fine to return back to [place].   Pt's and her daughter stated that upon D/C that a family memeber will transport pt back to [place].  Pt also stated that she does have a rollator  walker at [place].   Per Bisi at [place] if pt will need PT they can arrange that at [place].    Transfer packet complete and placed on pt's chart.   CCP will be avialable if any additional D/C needs do arise.          Discharge Placement     Facility/Agency Request Status Selected? Address Phone Number Fax Number    [facility] Accepted     [address] [address] [phone]                Demographic Summary       04/14/17 1058    Referral Information    Admission Type inpatient    Arrived From other (see comments)   [facility]    Referral Source admission list;physician    Reason For Consult discharge planning            Functional Status       04/14/17 1100    Functional Status Current    Ambulation 1-->assistive equipment    Transferring 1-->assistive equipment    Toileting 1-->assistive equipment    Bathing 2-->assistive person    Dressing 1-->assistive equipment    Eating 0-->independent    Communication 0-->understands/communicates without difficulty    Swallowing (if score 2 or more for any item, consult Rehab Services) 0-->swallows  foods/liquids without difficulty    Functional Status Prior    Ambulation 1-->assistive equipment    Transferring 1-->assistive equipment    Toileting 1-->assistive equipment    Bathing 2-->assistive person    Dressing 0-->independent    Eating 0-->independent    Communication 0-->understands/communicates without difficulty    Swallowing 0-->swallows foods/liquids without difficulty            Psychosocial     None            Abuse/Neglect     None            Legal     None            Substance Abuse     None            Patient Forms     None          TOBIAS Wilson     patient

## 2023-06-15 NOTE — PROGRESS NOTE ADULT - ASSESSMENT
63M with non traumatic SAH     Plan:  - Continue q2 neuro checks   - CTA/CTV head and neck   - SBP , controlled off of cardene  - ADAT  - SCDS only for dvt ppx  - Voiding   -Plan for cerebral angiogram in AM  -Final plan pending AM rounds

## 2023-06-15 NOTE — PHYSICAL THERAPY INITIAL EVALUATION ADULT - ADDITIONAL COMMENTS
Pt. lives with his wife in a house with  4 DONALD and 10 inside with rail. No DME and pt. was independent PTA.

## 2023-06-15 NOTE — CHART NOTE - NSCHARTNOTEFT_GEN_A_CORE
Neurointerventional Surgery  Pre-Procedure Note     This is a 63y man with right occipital ICH.   HPI:  63M has had 1 week of headaches. The headaches are constant, they are worse when he is laying down. His PCP sent him for an MRI, MRA for a headache work up which revealed a right occipital region hemorrhage with surrounding vasogenic edema and subarachnoid blood. Pt directed to ED for further eval. ED got CT scan that shows right parietal SAH. HH1, MF 0. MRS 0 (14 Jun 2023 15:23)      Allergies: No Known Allergies      PMH/PSH:  PAST MEDICAL & SURGICAL HISTORY:  No pertinent past medical history      No significant past surgical history          Social History:   Social History:  Works as a , lives at home with family  He is independent in ADLs   No tobacco use, occasional alcohol, no drug use (14 Jun 2023 15:23)    FAMILY HISTORY:      Current Medications:   acetaminophen     Tablet .. 650 milliGRAM(s) Oral every 6 hours PRN  chlorhexidine 2% Cloths 1 Application(s) Topical daily  hydrALAZINE Injectable 10 milliGRAM(s) IV Push every 2 hours PRN  labetalol Injectable 10 milliGRAM(s) IV Push every 2 hours PRN  levETIRAcetam  IVPB 500 milliGRAM(s) IV Intermittent every 12 hours  multiple electrolytes Injection Type 1 1000 milliLiter(s) IV Continuous <Continuous>  mupirocin 2% Nasal 1 Application(s) Both Nostrils two times a day  niCARdipine Infusion 5 mG/Hr IV Continuous <Continuous>  niMODipine 60 milliGRAM(s) Oral every 4 hours  ondansetron Injectable 4 milliGRAM(s) IV Push every 6 hours PRN      Physical Exam:  Constitutional: NAD, lying in bed  Neuro  * Mental Status:  GCS 15: Awake, alert, oriented to conversation. No aphasia or difficulty speaking. No dysarthria. Able to name objects and their function.  * Cranial Nerves: Cnii-Cnxii grossly intact. PERRL, EOMI, tongue midline, no gaze deviation, visual fields full to confrontation.   * Motor: RUE 5/5, LUE 5/5, RLE 5/5, LLE 5/5, no drift or dysmetria  * Sensory: Sensation intact to light touch  Cardiovascular: Regular rate and rhythm.    NIHSS:  DATE: 6/15/2013  TIME: 12:00PM  1A: Level of consciousness (0-3): 0  1B: Questions (0-2): 0    1C: Commands (0-2): 0  2: Gaze (0-2): 0  3: Visual fields (0-3): 0  4: Facial palsy (0-3): 0  MOTOR:  5A: Left arm motor drift (0-4): 0  5B: Right arm motor drift (0-4): 0  6A: Left leg motor drift (0-4): 0  6B: Right leg motor drift (0-4): 0  7: Limb ataxia (0-2): 0  SENSORY:  8: Sensation (0-2): 0  SPEECH:  9: Language (0-3): 0  10: Dysarthria (0-2): 0  EXTINCTION:  11: Extinction/inattention (0-2): 0    TOTAL SCORE: 0    Labs:                         13.9   6.20  )-----------( 197      ( 15 Juan 2023 03:15 )             40.1       06-15    141  |  105  |  20.2<H>  ----------------------------<  111<H>  4.0   |  24.0  |  1.51<H>    Ca    8.4      15 Juan 2023 03:15  Phos  3.9     06-15  Mg     2.1     06-15    TPro  7.1  /  Alb  4.3  /  TBili  0.3<L>  /  DBili  x   /  AST  30  /  ALT  34  /  AlkPhos  88  06-14          PT/INR - ( 15 Juan 2023 03:15 )   PT: 11.5 sec;   INR: 0.99 ratio         PTT - ( 14 Jun 2023 14:49 )  PTT:33.8 sec      Assessment/Plan:   This is a 63y  year old male presents with right occipital ICH.   Patient presents to neuro-IR for selective cerebral angiography.     Procedure, goals, risks, benefits and alternatives  were discussed with patient.  All questions were answered.  Risks include but are not limited to stroke, vessel injury, hemorrhage, and or groin hematoma.  Patient demonstrates understanding  of all risks involved with this procedure and wishes to continue.   Appropriate  consent was obtained from patient and consent is in the patient's chart.

## 2023-06-15 NOTE — PHYSICAL THERAPY INITIAL EVALUATION ADULT - PERTINENT HX OF CURRENT PROBLEM, REHAB EVAL
63M has had 1 week of headaches. The headaches are constant, they are worse when he is laying down. His PCP sent him for an MRI, MRA for a headache work up which revealed a right occipital region hemorrhage with surrounding vasogenic edema and subarachnoid blood. Pt directed to ED for further eval. ED got CT scan that shows right parietal SAH

## 2023-06-15 NOTE — PROGRESS NOTE ADULT - ASSESSMENT
63M has had 1 week of headaches. The headaches are constant, they are worse when he is laying down. His PCP sent him for an MRI, MRA for a headache work up which revealed a right occipital region hemorrhage with surrounding vasogenic edema and subarachnoid blood. Pt directed to ED for further eval. ED got CT scan that shows right parietal SAH. HH1, MF 0. MRS 0 (14 Jun 2023 15:23) In Ed placed on nicardipine.     Neuro:   -q1 neurochecks/q1 vital checks.   -Keppra 500 BID  -Preop for angio  - 6 hours Repeat ct head.   BP: 100-140   - cardene   - PRN: labetolol    Resp: RA   satting well     GI:   regular diet  - Protonix 40: for dypepsia   PRN: zofran     :   - voiding   NS @ 50  - Creatinine increasing   - renal US   -UA , Ur Creatinine, Urine sodium, Ur urea    Heme:   SCD    ID:   afebrile  63M has had 1 week of headaches. The headaches are constant, they are worse when he is laying down. His PCP sent him for an MRI, MRA for a headache work up which revealed a right occipital region hemorrhage with surrounding vasogenic edema and subarachnoid blood. Pt directed to ED for further eval. ED got CT scan that shows right parietal SAH. HH1, MF 0. MRS 0 (14 Jun 2023 15:23) In Ed placed on nicardipine.     Neuro:   -q1 neurochecks/q1 vital checks  - nimodipine  - TCDs tomorrow   -Keppra 500 BID  -Preop for angio today    BP: 100-140   - cardene   - PRN: labetolol    Resp: RA   satting well     GI:   NPO for angio  - Protonix 40: for dypepsia   PRN: zofran   LBM 6/13    :   - voiding   plasmalyte at 75cc/h  - monitor for CHANO  - unremarkable renal US    Heme:   SCD    ID:   afebrile

## 2023-06-15 NOTE — OCCUPATIONAL THERAPY INITIAL EVALUATION ADULT - LIVES WITH, PROFILE
wife and daughter in a private house with 4 steps to enter with railing and 10 steps inside with railing/children/spouse

## 2023-06-15 NOTE — DISCHARGE NOTE NURSING/CASE MANAGEMENT/SOCIAL WORK - PATIENT PORTAL LINK FT
You can access the FollowMyHealth Patient Portal offered by [facility] by registering at the following website: [email or web address]. By joining [facility]’s FollowMyHealth portal, you will also be able to view your health information using other applications (apps) compatible with our system. You can access the FollowMyHealth Patient Portal offered by [facility] by registering at the following website: [email or web address]. By joining [employer]’s FollowMyHealth portal, you will also be able to view your health information using other applications (apps) compatible with our system. You can access the FollowMyHealth Patient Portal offered by [facility] by registering at the following website: [email or web address]. By joining [employer]’s FollowMyHealth portal, you will also be able to view your health information using other applications (apps) compatible with our system.

## 2023-06-15 NOTE — DISCHARGE NOTE NURSING/CASE MANAGEMENT/SOCIAL WORK - NSDCPEFALRISK_GEN_ALL_CORE
For information on Fall & Injury Prevention, visit: [email or web address]-[email or web address]-mobility OR  [email or web address]-injury OR  https://www.cdc.gov/steadi/patient.html For information on Fall & Injury Prevention, visit: [email or web address]://[email or web address]-[email or web address]-health-[email or web address]-mobility OR  [email or web address]://[email or web address]-prevention-tips-[email or web address]-avoid-injury OR  https://www.cdc.gov/steadi/patient.html For information on Fall & Injury Prevention, visit: [email or web address]://www.[email or web address]/news/fall-prevention-protects-and-maintains-health-and-mobility OR  https://www.[email or web address]/news/fall-prevention-tips-to-avoid-injury OR  https://www.cdc.gov/steadi/patient.html

## 2023-06-15 NOTE — PROGRESS NOTE ADULT - SUBJECTIVE AND OBJECTIVE BOX
HPI:  63M has had 1 week of headaches. The headaches are constant, they are worse when he is laying down. His PCP sent him for an MRI, MRA for a headache work up which revealed a right occipital region hemorrhage with surrounding vasogenic edema and subarachnoid blood. Pt directed to ED for further eval. ED got CT scan that shows right parietal SAH. HH1, MF 0. MRS 0 (2023 15:23)    OVERNIGHT EVENTS:  No events overnight     63y Male seen lying comfortably in bed. Tolerating diet. Passing gas/BM. Voiding. Patient reports resolution of his headaches. Patient denies  weakness, numbness, n/v/d, fevers, chills, chest pain, SOB.       Vital Signs Last 24 Hrs  T(C): 36.8 (2023 23:57), Max: 36.8 (2023 14:11)  T(F): 98.2 (2023 23:57), Max: 98.3 (2023 14:11)  HR: 55 (2023 23:00) (55 - 90)  BP: 120/70 (2023 23:00) (111/49 - 185/90)  BP(mean): 84 (2023 23:00) (67 - 93)  RR: 15 (2023 23:00) (10 - 25)  SpO2: 98% (2023 23:00) (93% - 99%)    Parameters below as of 2023 20:00  Patient On (Oxygen Delivery Method): room air    Physical Exam:   Constitutional:  NAD  	Neuro  	* Mental Status:  GCS 15: E4, V5, M6. Awake, alert, oriented to conversation. No aphasia   	* Cranial Nerves: Cnii-Cnxii grossly intact. PERRL, EOMI, tongue midline, no gaze deviation. No field cut  	* Motor: RUE 5/5, LUE 5/5, RLE 5/5, LLE 5/5, no drift or dysmetria  	* Sensory: Sensation intact to light touch  	* Reflexes: Not assessed  	* Gait: Not assessed    	Cardiovascular:   Regular rate and rhythm.  	Eyes: See neurologic examination with detailed examination of eyes.  	ENT: No JVD, Trachea Midline.  	Respiratory: Clear to auscultation.  	Gastrointestinal: Soft, nontender, nondistended.  	Genitourinary: Male  	Musculoskeletal: No muscle wasting noted, (See neuorlogic assessment for full muscle strength assessment) No pretibial edema appreciated, no appreciable calf tenderness.  	Skin:  no skin breakdown  	Musculoskeletal: See detailed muscle strength examination, listed under neurologic examination.  Hematologic / Lymph / Immunologic: No bleeding from IV sites or wounds, No lymphadenopathy, No HIves or allergic type skin lesions      LABS:                        15.7   5.42  )-----------( 215      ( 2023 14:49 )             45.3         142  |  105  |  21.1<H>  ----------------------------<  102<H>  4.6   |  26.0  |  1.57<H>    Ca    9.1      2023 14:49  Phos  1.8       Mg     2.1         TPro  7.1  /  Alb  4.3  /  TBili  0.3<L>  /  DBili  x   /  AST  30  /  ALT  34  /  AlkPhos  88  14    PT/INR - ( 2023 14:49 )   PT: 11.4 sec;   INR: 0.98 ratio         PTT - ( 2023 14:49 )  PTT:33.8 sec  Urinalysis Basic - ( 2023 20:30 )    Color: Yellow / Appearance: Clear / S.010 / pH: x  Gluc: x / Ketone: Negative  / Bili: Negative / Urobili: Negative mg/dL   Blood: x / Protein: Negative / Nitrite: Negative   Leuk Esterase: Negative / RBC: x / WBC x   Sq Epi: x / Non Sq Epi: x / Bacteria: x         @ 07:01  -  06-15 @ 00:43  --------------------------------------------------------  IN: 720 mL / OUT: 400 mL / NET: 320 mL    RADIOLOGY & ADDITIONAL TESTS:    < from: CT Head No Cont (23 @ 14:25) >  IMPRESSION:  Subtle hypoattenuation in the right parieto-occipital lobe with   superimposed gyriform high attenuation, the combination of findings are   most concerningfor a subacute infarct with associated cortical petechial   hemorrhage. Correlate with MRI. No hydrocephalus, significant mass   effect, midline shift, or herniation.    --- End of Report ---    LEVI BERG MD; Attending Radiologist  This document has been electronically signed. 2023  2:31PM    < end of copied text >

## 2023-06-15 NOTE — PATIENT PROFILE ADULT - FALL HARM RISK - RISK INTERVENTIONS
Assistance OOB with selected safe patient handling equipment/Communicate Fall Risk and Risk Factors to all staff, patient, and family/Discuss with provider need for PT consult/Monitor gait and stability/Provide patient with walking aids - walker, cane, crutches/Reinforce activity limits and safety measures with patient and family/Visual Cue: Yellow wristband/Bed in lowest position, wheels locked, appropriate side rails in place/Call bell, personal items and telephone in reach/Instruct patient to call for assistance before getting out of bed or chair/Non-slip footwear when patient is out of bed/[place] to call system/Physically safe environment - no spills, clutter or unnecessary equipment/Purposeful Proactive Rounding/Room/bathroom lighting operational, light cord in reach Assistance OOB with selected safe patient handling equipment/Communicate Fall Risk and Risk Factors to all staff, patient, and family/Discuss with provider need for PT consult/Monitor gait and stability/Provide patient with walking aids - walker, cane, crutches/Reinforce activity limits and safety measures with patient and family/Visual Cue: Yellow wristband/Bed in lowest position, wheels locked, appropriate side rails in place/Call bell, personal items and telephone in reach/Instruct patient to call for assistance before getting out of bed or chair/Non-slip footwear when patient is out of bed/[place] to call system/Physically safe environment - no spills, clutter or unnecessary equipment/Purposeful Proactive Rounding/Room/bathroom lighting operational, light cord in reach Assistance OOB with selected safe patient handling equipment/Communicate Fall Risk and Risk Factors to all staff, patient, and family/Discuss with provider need for PT consult/Monitor gait and stability/Provide patient with walking aids - walker, cane, crutches/Reinforce activity limits and safety measures with patient and family/Visual Cue: Yellow wristband/Bed in lowest position, wheels locked, appropriate side rails in place/Call bell, personal items and telephone in reach/Instruct patient to call for assistance before getting out of bed or chair/Non-slip footwear when patient is out of bed/[place] to call system/Physically safe environment - no spills, clutter or unnecessary equipment/Purposeful Proactive Rounding/Room/bathroom lighting operational, light cord in reach

## 2023-06-15 NOTE — CHART NOTE - NSCHARTNOTEFT_GEN_A_CORE
Neurointerventional Surgery Post Procedure Note    Procedure: Selective Cerebral Angiography     Pre- Procedure Diagnosis: Intracerebral hemorrhage   Post- Procedure Diagnosis: Intracerebral hemorrhage    : Curt Tellez MD    Sheath:  - 4 Wol[place] Sheath    Preliminary Report:  Access: 4 Fr R CFA  Findings:  Selective angiography of the following vessels was performed:  left internal carotid artery  left external carotid artery  left vertebral artery  right internal carotid artery  right external carotid artery  right vertebral artery    Angiography reveals some intracranial atherosclerosis with mild stenoses of the basilar artery, the right M2 MCA, right distal ELYSSA. No aneurysm, AVM or aVFistual is seen. Large right laterally deviated superior sagittal sinus, normal anatomic variant. (Official report to follow)     Patient tolerated procedure well.  Patient remains hemodynamically stable, no change in neurological status compared to baseline.  Results were discussed with patient, patient's family and Neurosurgery/NeuroICU.   Right groin sheath  was discontinued. Hemostasis was obtained with approximately 15 minutes of manual compression.     No active bleeding, no hematoma, no ecchymosis.   Quick clot and safeguard balloon dressing applied.   Patient transferred to recovery room in stable condition.    Dressing Time: 13:55. Neurointerventional Surgery Post Procedure Note    Procedure: Selective Cerebral Angiography     Pre- Procedure Diagnosis: Intracerebral hemorrhage   Post- Procedure Diagnosis: Intracerebral hemorrhage    : Curt Tellez MD    Sheath:  - 4 [place]zbek Sheath    Preliminary Report:  Access: 4 Fr R CFA  Findings:  Selective angiography of the following vessels was performed:  left internal carotid artery  left external carotid artery  left vertebral artery  right internal carotid artery  right external carotid artery  right vertebral artery    Angiography reveals some intracranial atherosclerosis with mild stenoses of the basilar artery, the right M2 MCA, right distal ELYSSA. No aneurysm, AVM or aVFistual is seen. Large right laterally deviated superior sagittal sinus, normal anatomic variant. (Official report to follow)     Patient tolerated procedure well.  Patient remains hemodynamically stable, no change in neurological status compared to baseline.  Results were discussed with patient, patient's family and Neurosurgery/NeuroICU.   Right groin sheath  was discontinued. Hemostasis was obtained with approximately 15 minutes of manual compression.     No active bleeding, no hematoma, no ecchymosis.   Quick clot and safeguard balloon dressing applied.   Patient transferred to recovery room in stable condition.    Dressing Time: 13:55. Neurointerventional Surgery Post Procedure Note    Procedure: Selective Cerebral Angiography     Pre- Procedure Diagnosis: Intracerebral hemorrhage   Post- Procedure Diagnosis: Intracerebral hemorrhage    : Curt Tellez MD    Sheath:  - 4 Bulgarian Sheath    Preliminary Report:  Access: 4 Fr R CFA  Findings:  Selective angiography of the following vessels was performed:  left internal carotid artery  left external carotid artery  left vertebral artery  right internal carotid artery  right external carotid artery  right vertebral artery    Angiography reveals some intracranial atherosclerosis with mild stenoses of the basilar artery, the right M2 MCA, right distal ELYSSA. No aneurysm, AVM or aVFistual is seen. Large right laterally deviated superior sagittal sinus, normal anatomic variant. (Official report to follow)     Patient tolerated procedure well.  Patient remains hemodynamically stable, no change in neurological status compared to baseline.  Results were discussed with patient, patient's family and Neurosurgery/NeuroICU.   Right groin sheath  was discontinued. Hemostasis was obtained with approximately 15 minutes of manual compression.     No active bleeding, no hematoma, no ecchymosis.   Quick clot and safeguard balloon dressing applied.   Patient transferred to recovery room in stable condition.    Dressing Time: 13:55.

## 2023-06-16 LAB
ANION GAP SERPL CALC-SCNC: 11 MMOL/L — SIGNIFICANT CHANGE UP (ref 5–17)
BUN SERPL-MCNC: 14 MG/DL — SIGNIFICANT CHANGE UP (ref 8–20)
CALCIUM SERPL-MCNC: 8.9 MG/DL — SIGNIFICANT CHANGE UP (ref 8.4–10.5)
CHLORIDE SERPL-SCNC: 107 MMOL/L — SIGNIFICANT CHANGE UP (ref 96–108)
CO2 SERPL-SCNC: 25 MMOL/L — SIGNIFICANT CHANGE UP (ref 22–29)
CREAT SERPL-MCNC: 1.28 MG/DL — SIGNIFICANT CHANGE UP (ref 0.5–1.3)
EGFR: 62 ML/MIN/1.73M2 — SIGNIFICANT CHANGE UP
GLUCOSE SERPL-MCNC: 95 MG/DL — SIGNIFICANT CHANGE UP (ref 70–99)
HCT VFR BLD CALC: 44.8 % — SIGNIFICANT CHANGE UP (ref 39–50)
HGB BLD-MCNC: 15.7 G/DL — SIGNIFICANT CHANGE UP (ref 13–17)
MAGNESIUM SERPL-MCNC: 2.2 MG/DL — SIGNIFICANT CHANGE UP (ref 1.6–2.6)
MCHC RBC-ENTMCNC: 30.7 PG — SIGNIFICANT CHANGE UP (ref 27–34)
MCHC RBC-ENTMCNC: 35 GM/DL — SIGNIFICANT CHANGE UP (ref 32–36)
MCV RBC AUTO: 87.5 FL — SIGNIFICANT CHANGE UP (ref 80–100)
PHOSPHATE SERPL-MCNC: 3.1 MG/DL — SIGNIFICANT CHANGE UP (ref 2.4–4.7)
PLATELET # BLD AUTO: 176 K/UL — SIGNIFICANT CHANGE UP (ref 150–400)
POTASSIUM SERPL-MCNC: 4.3 MMOL/L — SIGNIFICANT CHANGE UP (ref 3.5–5.3)
POTASSIUM SERPL-SCNC: 4.3 MMOL/L — SIGNIFICANT CHANGE UP (ref 3.5–5.3)
RBC # BLD: 5.12 M/UL — SIGNIFICANT CHANGE UP (ref 4.2–5.8)
RBC # FLD: 12 % — SIGNIFICANT CHANGE UP (ref 10.3–14.5)
SODIUM SERPL-SCNC: 143 MMOL/L — SIGNIFICANT CHANGE UP (ref 135–145)
WBC # BLD: 7.38 K/UL — SIGNIFICANT CHANGE UP (ref 3.8–10.5)
WBC # FLD AUTO: 7.38 K/UL — SIGNIFICANT CHANGE UP (ref 3.8–10.5)

## 2023-06-16 PROCEDURE — 99232 SBSQ HOSP IP/OBS MODERATE 35: CPT

## 2023-06-16 PROCEDURE — 99222 1ST HOSP IP/OBS MODERATE 55: CPT

## 2023-06-16 PROCEDURE — 99233 SBSQ HOSP IP/OBS HIGH 50: CPT

## 2023-06-16 RX ORDER — LABETALOL HCL 100 MG
10 TABLET ORAL
Refills: 0 | Status: DISCONTINUED | OUTPATIENT
Start: 2023-06-16 | End: 2023-06-18

## 2023-06-16 RX ORDER — ENOXAPARIN SODIUM 100 MG/ML
40 INJECTION SUBCUTANEOUS EVERY 24 HOURS
Refills: 0 | Status: DISCONTINUED | OUTPATIENT
Start: 2023-06-16 | End: 2023-06-18

## 2023-06-16 RX ORDER — HYDRALAZINE HCL 50 MG
10 TABLET ORAL
Refills: 0 | Status: DISCONTINUED | OUTPATIENT
Start: 2023-06-16 | End: 2023-06-18

## 2023-06-16 RX ORDER — AMLODIPINE BESYLATE 2.5 MG/1
5 TABLET ORAL ONCE
Refills: 0 | Status: COMPLETED | OUTPATIENT
Start: 2023-06-16 | End: 2023-06-16

## 2023-06-16 RX ADMIN — LEVETIRACETAM 400 MILLIGRAM(S): 250 TABLET, FILM COATED ORAL at 18:22

## 2023-06-16 RX ADMIN — AMLODIPINE BESYLATE 5 MILLIGRAM(S): 2.5 TABLET ORAL at 11:00

## 2023-06-16 RX ADMIN — Medication 10 MILLIGRAM(S): at 20:03

## 2023-06-16 RX ADMIN — MUPIROCIN 1 APPLICATION(S): 20 OINTMENT TOPICAL at 06:18

## 2023-06-16 RX ADMIN — MUPIROCIN 1 APPLICATION(S): 20 OINTMENT TOPICAL at 18:22

## 2023-06-16 RX ADMIN — SODIUM CHLORIDE 75 MILLILITER(S): 9 INJECTION, SOLUTION INTRAVENOUS at 06:19

## 2023-06-16 RX ADMIN — CHLORHEXIDINE GLUCONATE 1 APPLICATION(S): 213 SOLUTION TOPICAL at 11:01

## 2023-06-16 RX ADMIN — LEVETIRACETAM 400 MILLIGRAM(S): 250 TABLET, FILM COATED ORAL at 06:18

## 2023-06-16 NOTE — PROGRESS NOTE ADULT - SUBJECTIVE AND OBJECTIVE BOX
HPI:  63M has had 1 week of headaches. The headaches are constant, they are worse when he is laying down. His PCP sent him for an MRI, MRA for a headache work up which revealed a right occipital region hemorrhage with surrounding vasogenic edema and subarachnoid blood. Pt directed to ED for further eval. ED got CT scan that shows right parietal SAH. HH1, MF 0. MRS 0 (2023 15:23)    INTERVAL HPI  6/15 Angio: Negative    OVERNIGHT EVENTS:  No events overnight     63y Male seen lying comfortably in bed. Tolerating diet. Passing gas/BM. Voiding. Patient reports resolution of his headaches. Patient denies  weakness, numbness, n/v/d, fevers, chills, chest pain, SOB.     Vital Signs Last 24 Hrs  T(C): 36.8 (15 Juan 2023 23:26), Max: 36.8 (15 Juan 2023 04:30)  T(F): 98.3 (15 Juan 2023 23:26), Max: 98.3 (15 Juan 2023 23:26)  HR: 52 (15 Juan 2023 21:00) (47 - 74)  BP: 115/61 (15 Juan 2023 20:00) (110/69 - 150/79)  BP(mean): 77 (15 Juan 2023 20:00) (70 - 110)  RR: 17 (15 Juan 2023 21:00) (12 - 31)  SpO2: 97% (15 Juan 2023 21:00) (96% - 100%)    Parameters below as of 15 Juan 2023 20:00  Patient On (Oxygen Delivery Method): room air          Physical Exam:   Constitutional:  NAD  	Neuro  	* Mental Status:  GCS 15: E4, V5, M6. Awake, alert, oriented to conversation. No aphasia   	* Cranial Nerves: Cnii-Cnxii grossly intact. PERRL, EOMI, tongue midline, no gaze deviation. No field cut  	* Motor: RUE 5/5, LUE 5/5, RLE 5/5, LLE 5/5, no drift or dysmetria  	* Sensory: Sensation intact to light touch  	* Reflexes: Not assessed  	* Gait: Not assessed    	Cardiovascular:   Regular rate and rhythm.  	Eyes: See neurologic examination with detailed examination of eyes.  	ENT: No JVD, Trachea Midline.  	Respiratory: Clear to auscultation.  	Gastrointestinal: Soft, nontender, nondistended.  	Genitourinary: Male  	Musculoskeletal: No muscle wasting noted, (See neuorlogic assessment for full muscle strength assessment) No pretibial edema appreciated, no appreciable calf tenderness.  	Skin:  no skin breakdown  	Musculoskeletal: See detailed muscle strength examination, listed under neurologic examination.  Hematologic / Lymph / Immunologic: No bleeding from IV sites or wounds, No lymphadenopathy, No HIves or allergic type skin lesions      LABS:                        13.9   6.20  )-----------( 197      ( 15 Juan 2023 03:15 )             40.1     -15    141  |  105  |  20.2<H>  ----------------------------<  111<H>  4.0   |  24.0  |  1.51<H>    Ca    8.4      15 Juan 2023 03:15  Phos  3.9     15  Mg     2.1     15    TPro  7.1  /  Alb  4.3  /  TBili  0.3<L>  /  DBili  x   /  AST  30  /  ALT  34  /  AlkPhos  88  06-14    PT/INR - ( 15 Juan 2023 03:15 )   PT: 11.5 sec;   INR: 0.99 ratio         PTT - ( 2023 14:49 )  PTT:33.8 sec  Urinalysis Basic - ( 2023 20:30 )    Color: Yellow / Appearance: Clear / S.010 / pH: x  Gluc: x / Ketone: Negative  / Bili: Negative / Urobili: Negative mg/dL   Blood: x / Protein: Negative / Nitrite: Negative   Leuk Esterase: Negative / RBC: x / WBC x   Sq Epi: x / Non Sq Epi: x / Bacteria: x         @ 07:01  -  06-15 @ 07:00  --------------------------------------------------------  IN: 1290 mL / OUT: 875 mL / NET: 415 mL    06-15 @ 07:16 @ 00:39  --------------------------------------------------------  IN: 1475 mL / OUT: 225 mL / NET: 1250 mL    RADIOLOGY & ADDITIONAL TESTS:    < from: CT Head No Cont (23 @ 14:25) >  IMPRESSION:  Subtle hypoattenuation in the right parieto-occipital lobe with   superimposed gyriform high attenuation, the combination of findings are   most concerningfor a subacute infarct with associated cortical petechial   hemorrhage. Correlate with MRI. No hydrocephalus, significant mass   effect, midline shift, or herniation.    --- End of Report ---    LEVI BERG MD; Attending Radiologist  This document has been electronically signed. 2023  2:31PM    < end of copied text >:

## 2023-06-16 NOTE — DIETITIAN INITIAL EVALUATION ADULT - PERTINENT MEDS FT
MEDICATIONS  (STANDING):  chlorhexidine 2% Cloths 1 Application(s) Topical daily  enoxaparin Injectable 40 milliGRAM(s) SubCutaneous every 24 hours  levETIRAcetam  IVPB 500 milliGRAM(s) IV Intermittent every 12 hours  mupirocin 2% Nasal 1 Application(s) Both Nostrils two times a day  niCARdipine Infusion 5 mG/Hr (25 mL/Hr) IV Continuous <Continuous>    MEDICATIONS  (PRN):  acetaminophen     Tablet .. 650 milliGRAM(s) Oral every 6 hours PRN Temp greater or equal to 38C (100.4F), Mild Pain (1 - 3)  hydrALAZINE Injectable 10 milliGRAM(s) IV Push every 2 hours PRN SBP > 160  labetalol Injectable 10 milliGRAM(s) IV Push every 2 hours PRN SBP > 140  labetalol Injectable 10 milliGRAM(s) IV Push every 2 hours PRN SBP > 160

## 2023-06-16 NOTE — CHART NOTE - NSCHARTNOTESELECT_GEN_ALL_CORE
Transfer Note
Neurointerventional Post Procedure Note/Event Note
Neurointerventional Preprocedure Note/Event Note

## 2023-06-16 NOTE — CONSULT NOTE ADULT - SUBJECTIVE AND OBJECTIVE BOX
Preliminary note, official note pending attending review/signature.                               [facility] Stroke Team  CC: headache   HPI:  63M has presented with 1 week of headaches. Took aspirin daily on his own but stopped few weeks ago and began Tumeric. The headaches are constant, they are worse when he is laying down. His PCP sent him for an MRI, MRA for a headache work up which revealed a right occipital region hemorrhage with surrounding vasogenic edema and subarachnoid blood. Pt directed to ED for further eval. ED got CT scan that shows right parietal SAH. HH1, MF 0. MRS 0        PAST MEDICAL & SURGICAL HISTORY:  No pertinent past medical history  No significant past surgical history    MEDICATIONS  (STANDING):  chlorhexidine 2% Cloths 1 Application(s) Topical daily  enoxaparin Injectable 40 milliGRAM(s) SubCutaneous every 24 hours  levETIRAcetam  IVPB 500 milliGRAM(s) IV Intermittent every 12 hours  mupirocin 2% Nasal 1 Application(s) Both Nostrils two times a day  niCARdipine Infusion 5 mG/Hr (25 mL/Hr) IV Continuous <Continuous>    MEDICATIONS  (PRN):  acetaminophen     Tablet .. 650 milliGRAM(s) Oral every 6 hours PRN Temp greater or equal to 38C (100.4F), Mild Pain (1 - 3)  hydrALAZINE Injectable 10 milliGRAM(s) IV Push every 2 hours PRN SBP > 160  labetalol Injectable 10 milliGRAM(s) IV Push every 2 hours PRN SBP > 140  labetalol Injectable 10 milliGRAM(s) IV Push every 2 hours PRN SBP > 160    Allergies  No Known Allergies    SOCIAL HISTORY:  no tob,   no alcohol   no drugs    FAMILY HISTORY:  Mother PPM     ROS: 14 point ROS negative other than what is present in HPI or below    Vital Signs Last 24 Hrs  T(C): 36.7 (16 Jun 2023 11:29), Max: 36.9 (16 Jun 2023 04:26)  T(F): 98 (16 Jun 2023 11:29), Max: 98.4 (16 Jun 2023 04:26)  HR: 64 (16 Jun 2023 11:00) (46 - 73)  BP: 135/73 (16 Jun 2023 11:00) (111/58 - 153/91)  BP(mean): 92 (16 Jun 2023 11:00) (70 - 112)  RR: 19 (16 Jun 2023 11:00) (11 - 25)  SpO2: 99% (16 Jun 2023 11:00) (96% - 100%)    Parameters below as of 16 Jun 2023 08:00  Patient On (Oxygen Delivery Method): room air    General: NAD    Detailed Neurologic Exam:    Mental status: The patient is awake and alert and has normal attention span.  The patient is fully oriented in 3 spheres. The patient is oriented to current events. The patient is able to name objects, follow commands, repeat sentences.    Cranial nerves: left facial palsy, Pupils equal and react symmetrically to light 3mm b/l slightly sluggish but symmetric b/l (history of bilateral eye surgery). There is no visual field deficit to confrontation. Extraocular motion is full with no nystagmus. There is no ptosis. Facial sensation is intact.  Palate elevates symmetrically. Tongue is midline.    Motor: There is normal bulk and tone.  There is no tremor.  Strength is 5/5 in the right arm and leg.   Strength is 5/5 in the left arm and leg.    Sensation: Intact to light touch and pin in 4 extremities     Cerebellar: There is no dysmetria on finger to nose testing.    Gait : deferred    [facility] SS:  DATE: 6/16/23  TIME:1145  1A: Level of consciousness (0-3):   1B: Questions (0-2):   1C: Commands (0-2):   2: Gaze (0-2):   3: Visual fields (0-3):   4: Facial palsy (0-3): 1  MOTOR:  5A: Left arm motor drift (0-4):   5B: Right arm motor drift (0-4):   6A: Left leg motor drift (0-4):   6B: Right leg motor drift (0-4):   7: Limb ataxia (0-2):   SENSORY:  8: Sensation (0-2):   SPEECH:  9: Language (0-3):   10: Dysarthria (0-2):   EXTINCTION:  11: Extinction/inattention (0-2):     TOTAL SCORE: 1    prehospital mRS= 0      LABS:                         15.7   7.38  )-----------( 176      ( 16 Jun 2023 03:38 )             44.8       06-16    143  |  107  |  14.0  ----------------------------<  95  4.3   |  25.0  |  1.28    Ca    8.9      16 Jun 2023 03:38  Phos  3.1     06-16  Mg     2.2     06-16    TPro  7.1  /  Alb  4.3  /  TBili  0.3<L>  /  DBili  x   /  AST  30  /  ALT  34  /  AlkPhos  88  06-14      PT/INR - ( 15 Juan 2023 03:15 )   PT: 11.5 sec;   INR: 0.99 ratio         PTT - ( 14 Jun 2023 14:49 )  PTT:33.8 sec    Lipid Profile in AM (06.15.23 @ 03:15)    Cholesterol, Serum: 183 mg/dL   Triglycerides, Serum: 131 mg/dL   HDL Cholesterol, Serum: 44 mg/dL   Non HDL Cholesterol: 139    LDL Cholesterol Calculated: 113 mg/dL    A1C with Estimated Average Glucose in AM (06.15.23 @ 03:15)    A1C with Estimated Average Glucose Result: 5.6 %   Estimated Average Glucose: 114 mg/dL    RADIOLOGY & ADDITIONAL STUDIES (independently reviewed unless otherwise noted):  CT Angio Head/ Neck w/ IV Cont (06.15.23 @ 01:17)   Evolving hypodensity/edema in the right parietal lobe suggesting subacute   infarct. Possible underlying space occupying lesion measuring 1.4 cm.   Contrast-enhanced MRI of the brain is recommended  No large vessel occlusion. No evidence of intracranial arterial aneurysm   or AVM.  Moderate stenosis of the right MCA M1 segmenta and mild stenosis of the   left MCA M1 segment.  Atherosclerotic disease of the PCAs  No evidence of dural sinus thrombosis.    CT Head No Cont (06.14.23 @ 14:25)   Subtle hypoattenuation in the right parieto-occipital lobe with   superimposed gyriform high attenuation, the combination of findings are   most concerningfor a subacute infarct with associated cortical petechial   hemorrhage. Correlate with MRI. No hydrocephalus, significant mass   effect, midline shift, or herniation.      Preliminary note, official note pending attending review/signature.                               [facility] Stroke Team  CC: headache   HPI:  63M has presented with 1 week of headaches. Took aspirin daily on his own but stopped few weeks ago and began Tumeric. The headaches are constant, they are worse when he is laying down. His PCP sent him for an MRI, MRA for a headache work up which revealed a right occipital region hemorrhage with surrounding vasogenic edema and subarachnoid blood. Pt directed to ED for further eval. ED got CT scan that shows right parietal SAH. HH1, MF 0. MRS 0        PAST MEDICAL & SURGICAL HISTORY:  No pertinent past medical history  No significant past surgical history    MEDICATIONS  (STANDING):  chlorhexidine 2% Cloths 1 Application(s) Topical daily  enoxaparin Injectable 40 milliGRAM(s) SubCutaneous every 24 hours  levETIRAcetam  IVPB 500 milliGRAM(s) IV Intermittent every 12 hours  mupirocin 2% Nasal 1 Application(s) Both Nostrils two times a day  niCARdipine Infusion 5 mG/Hr (25 mL/Hr) IV Continuous <Continuous>    MEDICATIONS  (PRN):  acetaminophen     Tablet .. 650 milliGRAM(s) Oral every 6 hours PRN Temp greater or equal to 38C (100.4F), Mild Pain (1 - 3)  hydrALAZINE Injectable 10 milliGRAM(s) IV Push every 2 hours PRN SBP > 160  labetalol Injectable 10 milliGRAM(s) IV Push every 2 hours PRN SBP > 140  labetalol Injectable 10 milliGRAM(s) IV Push every 2 hours PRN SBP > 160    Allergies  No Known Allergies    SOCIAL HISTORY:  no tob,   no alcohol   no drugs    FAMILY HISTORY:  Mother PPM     ROS: 14 point ROS negative other than what is present in HPI or below    Vital Signs Last 24 Hrs  T(C): 36.7 (16 Jun 2023 11:29), Max: 36.9 (16 Jun 2023 04:26)  T(F): 98 (16 Jun 2023 11:29), Max: 98.4 (16 Jun 2023 04:26)  HR: 64 (16 Jun 2023 11:00) (46 - 73)  BP: 135/73 (16 Jun 2023 11:00) (111/58 - 153/91)  BP(mean): 92 (16 Jun 2023 11:00) (70 - 112)  RR: 19 (16 Jun 2023 11:00) (11 - 25)  SpO2: 99% (16 Jun 2023 11:00) (96% - 100%)    Parameters below as of 16 Jun 2023 08:00  Patient On (Oxygen Delivery Method): room air    General: NAD    Detailed Neurologic Exam:    Mental status: The patient is awake and alert and has normal attention span.  The patient is fully oriented in 3 spheres. The patient is oriented to current events. The patient is able to name objects, follow commands, repeat sentences.    Cranial nerves: left facial palsy, Pupils equal and react symmetrically to light 3mm b/l slightly sluggish but symmetric b/l (history of bilateral eye surgery). There is no visual field deficit to confrontation. Extraocular motion is full with no nystagmus. There is no ptosis. Facial sensation is intact.  Palate elevates symmetrically. Tongue is midline.    Motor: There is normal bulk and tone.  There is no tremor.  Strength is 5/5 in the right arm and leg.   Strength is 5/5 in the left arm and leg.    Sensation: Intact to light touch and pin in 4 extremities     Cerebellar: There is no dysmetria on finger to nose testing.    Gait : deferred    [facility] SS:  DATE: 6/16/23  TIME:1145  1A: Level of consciousness (0-3):   1B: Questions (0-2):   1C: Commands (0-2):   2: Gaze (0-2):   3: Visual fields (0-3):   4: Facial palsy (0-3): 1  MOTOR:  5A: Left arm motor drift (0-4):   5B: Right arm motor drift (0-4):   6A: Left leg motor drift (0-4):   6B: Right leg motor drift (0-4):   7: Limb ataxia (0-2):   SENSORY:  8: Sensation (0-2):   SPEECH:  9: Language (0-3):   10: Dysarthria (0-2):   EXTINCTION:  11: Extinction/inattention (0-2):     TOTAL SCORE: 1    prehospital mRS= 0      LABS:                         15.7   7.38  )-----------( 176      ( 16 Jun 2023 03:38 )             44.8       06-16    143  |  107  |  14.0  ----------------------------<  95  4.3   |  25.0  |  1.28    Ca    8.9      16 Jun 2023 03:38  Phos  3.1     06-16  Mg     2.2     06-16    TPro  7.1  /  Alb  4.3  /  TBili  0.3<L>  /  DBili  x   /  AST  30  /  ALT  34  /  AlkPhos  88  06-14      PT/INR - ( 15 Juan 2023 03:15 )   PT: 11.5 sec;   INR: 0.99 ratio         PTT - ( 14 Jun 2023 14:49 )  PTT:33.8 sec    Lipid Profile in AM (06.15.23 @ 03:15)    Cholesterol, Serum: 183 mg/dL   Triglycerides, Serum: 131 mg/dL   HDL Cholesterol, Serum: 44 mg/dL   Non HDL Cholesterol: 139    LDL Cholesterol Calculated: 113 mg/dL    A1C with Estimated Average Glucose in AM (06.15.23 @ 03:15)    A1C with Estimated Average Glucose Result: 5.6 %   Estimated Average Glucose: 114 mg/dL    RADIOLOGY & ADDITIONAL STUDIES (independently reviewed unless otherwise noted):  CT Angio Head/ Neck w/ IV Cont (06.15.23 @ 01:17)   Evolving hypodensity/edema in the right parietal lobe suggesting subacute   infarct. Possible underlying space occupying lesion measuring 1.4 cm.   Contrast-enhanced MRI of the brain is recommended  No large vessel occlusion. No evidence of intracranial arterial aneurysm   or AVM.  Moderate stenosis of the right MCA M1 segmenta and mild stenosis of the   left MCA M1 segment.  Atherosclerotic disease of the PCAs  No evidence of dural sinus thrombosis.    CT Head No Cont (06.14.23 @ 14:25)   Subtle hypoattenuation in the right parieto-occipital lobe with   superimposed gyriform high attenuation, the combination of findings are   most concerningfor a subacute infarct with associated cortical petechial   hemorrhage. Correlate with MRI. No hydrocephalus, significant mass   effect, midline shift, or herniation.      Preliminary note, official note pending attending review/signature.                               [facility] Stroke Team  CC: headache   HPI:  63M has presented with 1 week of headaches. Took aspirin daily on his own but stopped few weeks ago and began Tumeric. The headaches are constant, they are worse when he is laying down. His PCP sent him for an MRI, MRA for a headache work up which revealed a right occipital region hemorrhage with surrounding vasogenic edema and subarachnoid blood. Pt directed to ED for further eval. ED got CT scan that shows right parietal SAH. HH1, MF 0. MRS 0        PAST MEDICAL & SURGICAL HISTORY:  No pertinent past medical history  No significant past surgical history    MEDICATIONS  (STANDING):  chlorhexidine 2% Cloths 1 Application(s) Topical daily  enoxaparin Injectable 40 milliGRAM(s) SubCutaneous every 24 hours  levETIRAcetam  IVPB 500 milliGRAM(s) IV Intermittent every 12 hours  mupirocin 2% Nasal 1 Application(s) Both Nostrils two times a day  niCARdipine Infusion 5 mG/Hr (25 mL/Hr) IV Continuous <Continuous>    MEDICATIONS  (PRN):  acetaminophen     Tablet .. 650 milliGRAM(s) Oral every 6 hours PRN Temp greater or equal to 38C (100.4F), Mild Pain (1 - 3)  hydrALAZINE Injectable 10 milliGRAM(s) IV Push every 2 hours PRN SBP > 160  labetalol Injectable 10 milliGRAM(s) IV Push every 2 hours PRN SBP > 140  labetalol Injectable 10 milliGRAM(s) IV Push every 2 hours PRN SBP > 160    Allergies  No Known Allergies    SOCIAL HISTORY:  no tob,   no alcohol   no drugs    FAMILY HISTORY:  Mother PPM     ROS: 14 point ROS negative other than what is present in HPI or below    Vital Signs Last 24 Hrs  T(C): 36.7 (16 Jun 2023 11:29), Max: 36.9 (16 Jun 2023 04:26)  T(F): 98 (16 Jun 2023 11:29), Max: 98.4 (16 Jun 2023 04:26)  HR: 64 (16 Jun 2023 11:00) (46 - 73)  BP: 135/73 (16 Jun 2023 11:00) (111/58 - 153/91)  BP(mean): 92 (16 Jun 2023 11:00) (70 - 112)  RR: 19 (16 Jun 2023 11:00) (11 - 25)  SpO2: 99% (16 Jun 2023 11:00) (96% - 100%)    Parameters below as of 16 Jun 2023 08:00  Patient On (Oxygen Delivery Method): room air    General: NAD    Detailed Neurologic Exam:    Mental status: The patient is awake and alert and has normal attention span.  The patient is fully oriented in 3 spheres. The patient is oriented to current events. The patient is able to name objects, follow commands, repeat sentences.    Cranial nerves: left facial palsy, Pupils equal and react symmetrically to light 3mm b/l slightly sluggish but symmetric b/l (history of bilateral eye surgery). There is no visual field deficit to confrontation. Extraocular motion is full with no nystagmus. There is no ptosis. Facial sensation is intact.  Palate elevates symmetrically. Tongue is midline.    Motor: There is normal bulk and tone.  There is no tremor.  Strength is 5/5 in the right arm and leg.   Strength is 5/5 in the left arm and leg.    Sensation: Intact to light touch and pin in 4 extremities     Cerebellar: There is no dysmetria on finger to nose testing.    Gait : deferred    [facility] SS:  DATE: 6/16/23  TIME:1145  1A: Level of consciousness (0-3):   1B: Questions (0-2):   1C: Commands (0-2):   2: Gaze (0-2):   3: Visual fields (0-3):   4: Facial palsy (0-3): 1  MOTOR:  5A: Left arm motor drift (0-4):   5B: Right arm motor drift (0-4):   6A: Left leg motor drift (0-4):   6B: Right leg motor drift (0-4):   7: Limb ataxia (0-2):   SENSORY:  8: Sensation (0-2):   SPEECH:  9: Language (0-3):   10: Dysarthria (0-2):   EXTINCTION:  11: Extinction/inattention (0-2):     TOTAL SCORE: 1    prehospital mRS= 0      LABS:                         15.7   7.38  )-----------( 176      ( 16 Jun 2023 03:38 )             44.8       06-16    143  |  107  |  14.0  ----------------------------<  95  4.3   |  25.0  |  1.28    Ca    8.9      16 Jun 2023 03:38  Phos  3.1     06-16  Mg     2.2     06-16    TPro  7.1  /  Alb  4.3  /  TBili  0.3<L>  /  DBili  x   /  AST  30  /  ALT  34  /  AlkPhos  88  06-14      PT/INR - ( 15 Juan 2023 03:15 )   PT: 11.5 sec;   INR: 0.99 ratio         PTT - ( 14 Jun 2023 14:49 )  PTT:33.8 sec    Lipid Profile in AM (06.15.23 @ 03:15)    Cholesterol, Serum: 183 mg/dL   Triglycerides, Serum: 131 mg/dL   HDL Cholesterol, Serum: 44 mg/dL   Non HDL Cholesterol: 139    LDL Cholesterol Calculated: 113 mg/dL    A1C with Estimated Average Glucose in AM (06.15.23 @ 03:15)    A1C with Estimated Average Glucose Result: 5.6 %   Estimated Average Glucose: 114 mg/dL    RADIOLOGY & ADDITIONAL STUDIES (independently reviewed unless otherwise noted):  CT Angio Head/ Neck w/ IV Cont (06.15.23 @ 01:17)   Evolving hypodensity/edema in the right parietal lobe suggesting subacute   infarct. Possible underlying space occupying lesion measuring 1.4 cm.   Contrast-enhanced MRI of the brain is recommended  No large vessel occlusion. No evidence of intracranial arterial aneurysm   or AVM.  Moderate stenosis of the right MCA M1 segmenta and mild stenosis of the   left MCA M1 segment.  Atherosclerotic disease of the PCAs  No evidence of dural sinus thrombosis.    CT Head No Cont (06.14.23 @ 14:25)   Subtle hypoattenuation in the right parieto-occipital lobe with   superimposed gyriform high attenuation, the combination of findings are   most concerningfor a subacute infarct with associated cortical petechial   hemorrhage. Correlate with MRI. No hydrocephalus, significant mass   effect, midline shift, or herniation.      Preliminary note, official note pending attending review/signature.                               [facility] Stroke Team  CC: headache   HPI:  63M has presented with 1 week of headaches. Took aspirin daily on his own but stopped few weeks ago and began Tumeric. The headaches are constant, they are worse when he is laying down. His PCP sent him for an MRI, MRA for a headache work up which revealed a right occipital region hemorrhage with surrounding vasogenic edema and subarachnoid blood. Pt directed to ED for further eval. ED got CT scan that shows right parietal SAH. HH1, MF 0. MRS 0        PAST MEDICAL & SURGICAL HISTORY:  No pertinent past medical history  No significant past surgical history    MEDICATIONS  (STANDING):  chlorhexidine 2% Cloths 1 Application(s) Topical daily  enoxaparin Injectable 40 milliGRAM(s) SubCutaneous every 24 hours  levETIRAcetam  IVPB 500 milliGRAM(s) IV Intermittent every 12 hours  mupirocin 2% Nasal 1 Application(s) Both Nostrils two times a day  niCARdipine Infusion 5 mG/Hr (25 mL/Hr) IV Continuous <Continuous>    MEDICATIONS  (PRN):  acetaminophen     Tablet .. 650 milliGRAM(s) Oral every 6 hours PRN Temp greater or equal to 38C (100.4F), Mild Pain (1 - 3)  hydrALAZINE Injectable 10 milliGRAM(s) IV Push every 2 hours PRN SBP > 160  labetalol Injectable 10 milliGRAM(s) IV Push every 2 hours PRN SBP > 140  labetalol Injectable 10 milliGRAM(s) IV Push every 2 hours PRN SBP > 160    Allergies  No Known Allergies    SOCIAL HISTORY:  no tob,   no alcohol   no drugs    FAMILY HISTORY:  Mother PPM     ROS: 14 point ROS negative other than what is present in HPI or below    Vital Signs Last 24 Hrs  T(C): 36.7 (16 Jun 2023 11:29), Max: 36.9 (16 Jun 2023 04:26)  T(F): 98 (16 Jun 2023 11:29), Max: 98.4 (16 Jun 2023 04:26)  HR: 64 (16 Jun 2023 11:00) (46 - 73)  BP: 135/73 (16 Jun 2023 11:00) (111/58 - 153/91)  BP(mean): 92 (16 Jun 2023 11:00) (70 - 112)  RR: 19 (16 Jun 2023 11:00) (11 - 25)  SpO2: 99% (16 Jun 2023 11:00) (96% - 100%)    Parameters below as of 16 Jun 2023 08:00  Patient On (Oxygen Delivery Method): room air    General: NAD    Detailed Neurologic Exam:    Mental status: The patient is awake and alert and has normal attention span.  The patient is fully oriented in 3 spheres. The patient is oriented to current events. The patient is able to name objects, follow commands, repeat sentences.    Cranial nerves: left facial palsy, Pupils equal and react symmetrically to light 3mm b/l slightly sluggish but symmetric b/l (history of bilateral eye surgery). There is no visual field deficit to confrontation. Extraocular motion is full with no nystagmus. There is no ptosis. Facial sensation is intact.  Palate elevates symmetrically. Tongue is midline.    Motor: There is normal bulk and tone.  There is no tremor.  Strength is 5/5 in the right arm and leg.   Strength is 5/5 in the left arm and leg.    Sensation: Intact to light touch and pin in 4 extremities     Cerebellar: There is no dysmetria on finger to nose testing.    Gait : deferred    [facility] SS:  DATE: 6/16/23  TIME:1145  1A: Level of consciousness (0-3):   1B: Questions (0-2):   1C: Commands (0-2):   2: Gaze (0-2):   3: Visual fields (0-3):   4: Facial palsy (0-3): 1  MOTOR:  5A: Left arm motor drift (0-4):   5B: Right arm motor drift (0-4):   6A: Left leg motor drift (0-4):   6B: Right leg motor drift (0-4):   7: Limb ataxia (0-2):   SENSORY:  8: Sensation (0-2):   SPEECH:  9: Language (0-3):   10: Dysarthria (0-2):   EXTINCTION:  11: Extinction/inattention (0-2):     TOTAL SCORE: 1    prehospital mRS= 0      LABS:                         15.7   7.38  )-----------( 176      ( 16 Jun 2023 03:38 )             44.8       06-16    143  |  107  |  14.0  ----------------------------<  95  4.3   |  25.0  |  1.28    Ca    8.9      16 Jun 2023 03:38  Phos  3.1     06-16  Mg     2.2     06-16    TPro  7.1  /  Alb  4.3  /  TBili  0.3<L>  /  DBili  x   /  AST  30  /  ALT  34  /  AlkPhos  88  06-14      PT/INR - ( 15 Juan 2023 03:15 )   PT: 11.5 sec;   INR: 0.99 ratio         PTT - ( 14 Jun 2023 14:49 )  PTT:33.8 sec    Lipid Profile in AM (06.15.23 @ 03:15)    Cholesterol, Serum: 183 mg/dL   Triglycerides, Serum: 131 mg/dL   HDL Cholesterol, Serum: 44 mg/dL   Non HDL Cholesterol: 139    LDL Cholesterol Calculated: 113 mg/dL    A1C with Estimated Average Glucose in AM (06.15.23 @ 03:15)    A1C with Estimated Average Glucose Result: 5.6 %   Estimated Average Glucose: 114 mg/dL    RADIOLOGY & ADDITIONAL STUDIES (independently reviewed unless otherwise noted):  CT Angio Head/ Neck w/ IV Cont (06.15.23 @ 01:17)   Evolving hypodensity/edema in the right parietal lobe suggesting subacute   infarct. Possible underlying space occupying lesion measuring 1.4 cm.   Contrast-enhanced MRI of the brain is recommended  No large vessel occlusion. No evidence of intracranial arterial aneurysm   or AVM.  Moderate stenosis of the right MCA M1 segmenta and mild stenosis of the   left MCA M1 segment.  Atherosclerotic disease of the PCAs  No evidence of dural sinus thrombosis.    CT Head No Cont (06.14.23 @ 14:25)   Subtle hypoattenuation in the right parieto-occipital lobe with   superimposed gyriform high attenuation, the combination of findings are   most concerningfor a subacute infarct with associated cortical petechial   hemorrhage. Correlate with MRI. No hydrocephalus, significant mass   effect, midline shift, or herniation.     TTE Echo Complete w/o Contrast w/ Doppler (06.14.23 @ 21:02)   Summary:   1. Left ventricular ejection fraction, by visual estimation, is 65 to   70%.   2. Normal global left ventricular systolic function.   3. Spectral Doppler shows impaired relaxation pattern of left   ventricular myocardial filling (Grade I diastolic dysfunction).   4. There is mild left ventricular hypertrophy.   5. Normal right ventricular size and function.   6. There is no evidence of pericardial effusion.   7. Mild mitral annular calcification.   8. Trace mitral valve regurgitation.   9. Sclerotic aortic valve with normal opening.         Preliminary note, official note pending attending review/signature.                               [facility] Stroke Team  CC: headache   HPI:  63M has presented with 1 week of headaches. Took aspirin daily on his own but stopped few weeks ago and began Tumeric. The headaches are constant, they are worse when he is laying down. His PCP sent him for an MRI, MRA for a headache work up which revealed a right occipital region hemorrhage with surrounding vasogenic edema and subarachnoid blood. Pt directed to ED for further eval. ED got CT scan that shows right parietal SAH. HH1, MF 0. MRS 0        PAST MEDICAL & SURGICAL HISTORY:  No pertinent past medical history  No significant past surgical history    MEDICATIONS  (STANDING):  chlorhexidine 2% Cloths 1 Application(s) Topical daily  enoxaparin Injectable 40 milliGRAM(s) SubCutaneous every 24 hours  levETIRAcetam  IVPB 500 milliGRAM(s) IV Intermittent every 12 hours  mupirocin 2% Nasal 1 Application(s) Both Nostrils two times a day  niCARdipine Infusion 5 mG/Hr (25 mL/Hr) IV Continuous <Continuous>    MEDICATIONS  (PRN):  acetaminophen     Tablet .. 650 milliGRAM(s) Oral every 6 hours PRN Temp greater or equal to 38C (100.4F), Mild Pain (1 - 3)  hydrALAZINE Injectable 10 milliGRAM(s) IV Push every 2 hours PRN SBP > 160  labetalol Injectable 10 milliGRAM(s) IV Push every 2 hours PRN SBP > 140  labetalol Injectable 10 milliGRAM(s) IV Push every 2 hours PRN SBP > 160    Allergies  No Known Allergies    SOCIAL HISTORY:  no tob,   no alcohol   no drugs    FAMILY HISTORY:  Mother PPM     ROS: 14 point ROS negative other than what is present in HPI or below    Vital Signs Last 24 Hrs  T(C): 36.7 (16 Jun 2023 11:29), Max: 36.9 (16 Jun 2023 04:26)  T(F): 98 (16 Jun 2023 11:29), Max: 98.4 (16 Jun 2023 04:26)  HR: 64 (16 Jun 2023 11:00) (46 - 73)  BP: 135/73 (16 Jun 2023 11:00) (111/58 - 153/91)  BP(mean): 92 (16 Jun 2023 11:00) (70 - 112)  RR: 19 (16 Jun 2023 11:00) (11 - 25)  SpO2: 99% (16 Jun 2023 11:00) (96% - 100%)    Parameters below as of 16 Jun 2023 08:00  Patient On (Oxygen Delivery Method): room air    General: NAD    Detailed Neurologic Exam:    Mental status: The patient is awake and alert and has normal attention span.  The patient is fully oriented in 3 spheres. The patient is oriented to current events. The patient is able to name objects, follow commands, repeat sentences.    Cranial nerves: left facial palsy, Pupils equal and react symmetrically to light 3mm b/l slightly sluggish but symmetric b/l (history of bilateral eye surgery). There is no visual field deficit to confrontation. Extraocular motion is full with no nystagmus. There is no ptosis. Facial sensation is intact.  Palate elevates symmetrically. Tongue is midline.    Motor: There is normal bulk and tone.  There is no tremor.  Strength is 5/5 in the right arm and leg.   Strength is 5/5 in the left arm and leg.    Sensation: Intact to light touch and pin in 4 extremities     Cerebellar: There is no dysmetria on finger to nose testing.    Gait : deferred    [facility] SS:  DATE: 6/16/23  TIME:1145  1A: Level of consciousness (0-3):   1B: Questions (0-2):   1C: Commands (0-2):   2: Gaze (0-2):   3: Visual fields (0-3):   4: Facial palsy (0-3): 1  MOTOR:  5A: Left arm motor drift (0-4):   5B: Right arm motor drift (0-4):   6A: Left leg motor drift (0-4):   6B: Right leg motor drift (0-4):   7: Limb ataxia (0-2):   SENSORY:  8: Sensation (0-2):   SPEECH:  9: Language (0-3):   10: Dysarthria (0-2):   EXTINCTION:  11: Extinction/inattention (0-2):     TOTAL SCORE: 1    prehospital mRS= 0      LABS:                         15.7   7.38  )-----------( 176      ( 16 Jun 2023 03:38 )             44.8       06-16    143  |  107  |  14.0  ----------------------------<  95  4.3   |  25.0  |  1.28    Ca    8.9      16 Jun 2023 03:38  Phos  3.1     06-16  Mg     2.2     06-16    TPro  7.1  /  Alb  4.3  /  TBili  0.3<L>  /  DBili  x   /  AST  30  /  ALT  34  /  AlkPhos  88  06-14      PT/INR - ( 15 Juan 2023 03:15 )   PT: 11.5 sec;   INR: 0.99 ratio         PTT - ( 14 Jun 2023 14:49 )  PTT:33.8 sec    Lipid Profile in AM (06.15.23 @ 03:15)    Cholesterol, Serum: 183 mg/dL   Triglycerides, Serum: 131 mg/dL   HDL Cholesterol, Serum: 44 mg/dL   Non HDL Cholesterol: 139    LDL Cholesterol Calculated: 113 mg/dL    A1C with Estimated Average Glucose in AM (06.15.23 @ 03:15)    A1C with Estimated Average Glucose Result: 5.6 %   Estimated Average Glucose: 114 mg/dL    RADIOLOGY & ADDITIONAL STUDIES (independently reviewed unless otherwise noted):  CT Angio Head/ Neck w/ IV Cont (06.15.23 @ 01:17)   Evolving hypodensity/edema in the right parietal lobe suggesting subacute   infarct. Possible underlying space occupying lesion measuring 1.4 cm.   Contrast-enhanced MRI of the brain is recommended  No large vessel occlusion. No evidence of intracranial arterial aneurysm   or AVM.  Moderate stenosis of the right MCA M1 segmenta and mild stenosis of the   left MCA M1 segment.  Atherosclerotic disease of the PCAs  No evidence of dural sinus thrombosis.    CT Head No Cont (06.14.23 @ 14:25)   Subtle hypoattenuation in the right parieto-occipital lobe with   superimposed gyriform high attenuation, the combination of findings are   most concerningfor a subacute infarct with associated cortical petechial   hemorrhage. Correlate with MRI. No hydrocephalus, significant mass   effect, midline shift, or herniation.     TTE Echo Complete w/o Contrast w/ Doppler (06.14.23 @ 21:02)   Summary:   1. Left ventricular ejection fraction, by visual estimation, is 65 to   70%.   2. Normal global left ventricular systolic function.   3. Spectral Doppler shows impaired relaxation pattern of left   ventricular myocardial filling (Grade I diastolic dysfunction).   4. There is mild left ventricular hypertrophy.   5. Normal right ventricular size and function.   6. There is no evidence of pericardial effusion.   7. Mild mitral annular calcification.   8. Trace mitral valve regurgitation.   9. Sclerotic aortic valve with normal opening.         Preliminary note, official note pending attending review/signature.                               [facility] Stroke Team  CC: headache   HPI:  63M has presented with 1 week of headaches. Took aspirin daily on his own but stopped few weeks ago and began Tumeric. The headaches are constant, they are worse when he is laying down. His PCP sent him for an MRI, MRA for a headache work up which revealed a right occipital region hemorrhage with surrounding vasogenic edema and subarachnoid blood. Pt directed to ED for further eval. ED got CT scan that shows right parietal SAH. HH1, MF 0. MRS 0        PAST MEDICAL & SURGICAL HISTORY:  No pertinent past medical history  No significant past surgical history    MEDICATIONS  (STANDING):  chlorhexidine 2% Cloths 1 Application(s) Topical daily  enoxaparin Injectable 40 milliGRAM(s) SubCutaneous every 24 hours  levETIRAcetam  IVPB 500 milliGRAM(s) IV Intermittent every 12 hours  mupirocin 2% Nasal 1 Application(s) Both Nostrils two times a day  niCARdipine Infusion 5 mG/Hr (25 mL/Hr) IV Continuous <Continuous>    MEDICATIONS  (PRN):  acetaminophen     Tablet .. 650 milliGRAM(s) Oral every 6 hours PRN Temp greater or equal to 38C (100.4F), Mild Pain (1 - 3)  hydrALAZINE Injectable 10 milliGRAM(s) IV Push every 2 hours PRN SBP > 160  labetalol Injectable 10 milliGRAM(s) IV Push every 2 hours PRN SBP > 140  labetalol Injectable 10 milliGRAM(s) IV Push every 2 hours PRN SBP > 160    Allergies  No Known Allergies    SOCIAL HISTORY:  no tob,   no alcohol   no drugs    FAMILY HISTORY:  Mother PPM     ROS: 14 point ROS negative other than what is present in HPI or below    Vital Signs Last 24 Hrs  T(C): 36.7 (16 Jun 2023 11:29), Max: 36.9 (16 Jun 2023 04:26)  T(F): 98 (16 Jun 2023 11:29), Max: 98.4 (16 Jun 2023 04:26)  HR: 64 (16 Jun 2023 11:00) (46 - 73)  BP: 135/73 (16 Jun 2023 11:00) (111/58 - 153/91)  BP(mean): 92 (16 Jun 2023 11:00) (70 - 112)  RR: 19 (16 Jun 2023 11:00) (11 - 25)  SpO2: 99% (16 Jun 2023 11:00) (96% - 100%)    Parameters below as of 16 Jun 2023 08:00  Patient On (Oxygen Delivery Method): room air    General: NAD    Detailed Neurologic Exam:    Mental status: The patient is awake and alert and has normal attention span.  The patient is fully oriented in 3 spheres. The patient is oriented to current events. The patient is able to name objects, follow commands, repeat sentences.    Cranial nerves: left facial palsy, Pupils equal and react symmetrically to light 3mm b/l slightly sluggish but symmetric b/l (history of bilateral eye surgery). There is no visual field deficit to confrontation. Extraocular motion is full with no nystagmus. There is no ptosis. Facial sensation is intact.  Palate elevates symmetrically. Tongue is midline.    Motor: There is normal bulk and tone.  There is no tremor.  Strength is 5/5 in the right arm and leg.   Strength is 5/5 in the left arm and leg.    Sensation: Intact to light touch and pin in 4 extremities     Cerebellar: There is no dysmetria on finger to nose testing.    Gait : deferred    [facility] SS:  DATE: 6/16/23  TIME:1145  1A: Level of consciousness (0-3):   1B: Questions (0-2):   1C: Commands (0-2):   2: Gaze (0-2):   3: Visual fields (0-3):   4: Facial palsy (0-3): 1  MOTOR:  5A: Left arm motor drift (0-4):   5B: Right arm motor drift (0-4):   6A: Left leg motor drift (0-4):   6B: Right leg motor drift (0-4):   7: Limb ataxia (0-2):   SENSORY:  8: Sensation (0-2):   SPEECH:  9: Language (0-3):   10: Dysarthria (0-2):   EXTINCTION:  11: Extinction/inattention (0-2):     TOTAL SCORE: 1    prehospital mRS= 0      LABS:                         15.7   7.38  )-----------( 176      ( 16 Jun 2023 03:38 )             44.8       06-16    143  |  107  |  14.0  ----------------------------<  95  4.3   |  25.0  |  1.28    Ca    8.9      16 Jun 2023 03:38  Phos  3.1     06-16  Mg     2.2     06-16    TPro  7.1  /  Alb  4.3  /  TBili  0.3<L>  /  DBili  x   /  AST  30  /  ALT  34  /  AlkPhos  88  06-14      PT/INR - ( 15 Juan 2023 03:15 )   PT: 11.5 sec;   INR: 0.99 ratio         PTT - ( 14 Jun 2023 14:49 )  PTT:33.8 sec    Lipid Profile in AM (06.15.23 @ 03:15)    Cholesterol, Serum: 183 mg/dL   Triglycerides, Serum: 131 mg/dL   HDL Cholesterol, Serum: 44 mg/dL   Non HDL Cholesterol: 139    LDL Cholesterol Calculated: 113 mg/dL    A1C with Estimated Average Glucose in AM (06.15.23 @ 03:15)    A1C with Estimated Average Glucose Result: 5.6 %   Estimated Average Glucose: 114 mg/dL    RADIOLOGY & ADDITIONAL STUDIES (independently reviewed unless otherwise noted):  CT Angio Head/ Neck w/ IV Cont (06.15.23 @ 01:17)   Evolving hypodensity/edema in the right parietal lobe suggesting subacute   infarct. Possible underlying space occupying lesion measuring 1.4 cm.   Contrast-enhanced MRI of the brain is recommended  No large vessel occlusion. No evidence of intracranial arterial aneurysm   or AVM.  Moderate stenosis of the right MCA M1 segmenta and mild stenosis of the   left MCA M1 segment.  Atherosclerotic disease of the PCAs  No evidence of dural sinus thrombosis.    CT Head No Cont (06.14.23 @ 14:25)   Subtle hypoattenuation in the right parieto-occipital lobe with   superimposed gyriform high attenuation, the combination of findings are   most concerningfor a subacute infarct with associated cortical petechial   hemorrhage. Correlate with MRI. No hydrocephalus, significant mass   effect, midline shift, or herniation.     TTE Echo Complete w/o Contrast w/ Doppler (06.14.23 @ 21:02)   Summary:   1. Left ventricular ejection fraction, by visual estimation, is 65 to   70%.   2. Normal global left ventricular systolic function.   3. Spectral Doppler shows impaired relaxation pattern of left   ventricular myocardial filling (Grade I diastolic dysfunction).   4. There is mild left ventricular hypertrophy.   5. Normal right ventricular size and function.   6. There is no evidence of pericardial effusion.   7. Mild mitral annular calcification.   8. Trace mitral valve regurgitation.   9. Sclerotic aortic valve with normal opening.                                        [facility] Stroke Team  CC: headache   HPI:  63M has presented with 1 week of headaches. Took aspirin daily on his own but stopped few weeks ago and began Tumeric. The headaches are constant, they are worse when he is laying down. His PCP sent him for an MRI, MRA for a headache work up which revealed a right occipital region hemorrhage with surrounding vasogenic edema and subarachnoid blood. Pt directed to ED for further eval. ED got CT scan that shows right parietal SAH. HH1, MF 0. MRS 0        PAST MEDICAL & SURGICAL HISTORY:  No pertinent past medical history  No significant past surgical history    MEDICATIONS  (STANDING):  chlorhexidine 2% Cloths 1 Application(s) Topical daily  enoxaparin Injectable 40 milliGRAM(s) SubCutaneous every 24 hours  levETIRAcetam  IVPB 500 milliGRAM(s) IV Intermittent every 12 hours  mupirocin 2% Nasal 1 Application(s) Both Nostrils two times a day  niCARdipine Infusion 5 mG/Hr (25 mL/Hr) IV Continuous <Continuous>    MEDICATIONS  (PRN):  acetaminophen     Tablet .. 650 milliGRAM(s) Oral every 6 hours PRN Temp greater or equal to 38C (100.4F), Mild Pain (1 - 3)  hydrALAZINE Injectable 10 milliGRAM(s) IV Push every 2 hours PRN SBP > 160  labetalol Injectable 10 milliGRAM(s) IV Push every 2 hours PRN SBP > 140  labetalol Injectable 10 milliGRAM(s) IV Push every 2 hours PRN SBP > 160    Allergies  No Known Allergies    SOCIAL HISTORY:  no tob,   no alcohol   no drugs    FAMILY HISTORY:  Mother PPM     ROS: 14 point ROS negative other than what is present in HPI or below    Vital Signs Last 24 Hrs  T(C): 36.7 (16 Jun 2023 11:29), Max: 36.9 (16 Jun 2023 04:26)  T(F): 98 (16 Jun 2023 11:29), Max: 98.4 (16 Jun 2023 04:26)  HR: 64 (16 Jun 2023 11:00) (46 - 73)  BP: 135/73 (16 Jun 2023 11:00) (111/58 - 153/91)  BP(mean): 92 (16 Jun 2023 11:00) (70 - 112)  RR: 19 (16 Jun 2023 11:00) (11 - 25)  SpO2: 99% (16 Jun 2023 11:00) (96% - 100%)    Parameters below as of 16 Jun 2023 08:00  Patient On (Oxygen Delivery Method): room air    General: NAD    Detailed Neurologic Exam:    Mental status: The patient is awake and alert and has normal attention span.  The patient is fully oriented in 3 spheres. The patient is oriented to current events. The patient is able to name objects, follow commands, repeat sentences.    Cranial nerves: left facial palsy, Pupils equal and react symmetrically to light 3mm b/l slightly sluggish but symmetric b/l (history of bilateral eye surgery). There is no visual field deficit to confrontation. Extraocular motion is full with no nystagmus. There is no ptosis. Facial sensation is intact.  Palate elevates symmetrically. Tongue is midline.    Motor: There is normal bulk and tone.  There is no tremor.  Strength is 5/5 in the right arm and leg.   Strength is 5/5 in the left arm and leg.    Sensation: Intact to light touch and pin in 4 extremities     Cerebellar: There is no dysmetria on finger to nose testing.    Gait : deferred    [facility] SS:  DATE: 6/16/23  TIME:1145  1A: Level of consciousness (0-3):   1B: Questions (0-2):   1C: Commands (0-2):   2: Gaze (0-2):   3: Visual fields (0-3):   4: Facial palsy (0-3): 1  MOTOR:  5A: Left arm motor drift (0-4):   5B: Right arm motor drift (0-4):   6A: Left leg motor drift (0-4):   6B: Right leg motor drift (0-4):   7: Limb ataxia (0-2):   SENSORY:  8: Sensation (0-2):   SPEECH:  9: Language (0-3):   10: Dysarthria (0-2):   EXTINCTION:  11: Extinction/inattention (0-2):     TOTAL SCORE: 1    prehospital mRS= 0      LABS:                         15.7   7.38  )-----------( 176      ( 16 Jun 2023 03:38 )             44.8       06-16    143  |  107  |  14.0  ----------------------------<  95  4.3   |  25.0  |  1.28    Ca    8.9      16 Jun 2023 03:38  Phos  3.1     06-16  Mg     2.2     06-16    TPro  7.1  /  Alb  4.3  /  TBili  0.3<L>  /  DBili  x   /  AST  30  /  ALT  34  /  AlkPhos  88  06-14      PT/INR - ( 15 Juan 2023 03:15 )   PT: 11.5 sec;   INR: 0.99 ratio         PTT - ( 14 Jun 2023 14:49 )  PTT:33.8 sec    Lipid Profile in AM (06.15.23 @ 03:15)    Cholesterol, Serum: 183 mg/dL   Triglycerides, Serum: 131 mg/dL   HDL Cholesterol, Serum: 44 mg/dL   Non HDL Cholesterol: 139    LDL Cholesterol Calculated: 113 mg/dL    A1C with Estimated Average Glucose in AM (06.15.23 @ 03:15)    A1C with Estimated Average Glucose Result: 5.6 %   Estimated Average Glucose: 114 mg/dL    RADIOLOGY & ADDITIONAL STUDIES (independently reviewed unless otherwise noted):  CT Angio Head/ Neck w/ IV Cont (06.15.23 @ 01:17)   Evolving hypodensity/edema in the right parietal lobe suggesting subacute   infarct. Possible underlying space occupying lesion measuring 1.4 cm.   Contrast-enhanced MRI of the brain is recommended  No large vessel occlusion. No evidence of intracranial arterial aneurysm   or AVM.  Moderate stenosis of the right MCA M1 segmenta and mild stenosis of the   left MCA M1 segment.  Atherosclerotic disease of the PCAs  No evidence of dural sinus thrombosis.    CT Head No Cont (06.14.23 @ 14:25)   Subtle hypoattenuation in the right parieto-occipital lobe with   superimposed gyriform high attenuation, the combination of findings are   most concerningfor a subacute infarct with associated cortical petechial   hemorrhage. Correlate with MRI. No hydrocephalus, significant mass   effect, midline shift, or herniation.     TTE Echo Complete w/o Contrast w/ Doppler (06.14.23 @ 21:02)   Summary:   1. Left ventricular ejection fraction, by visual estimation, is 65 to   70%.   2. Normal global left ventricular systolic function.   3. Spectral Doppler shows impaired relaxation pattern of left   ventricular myocardial filling (Grade I diastolic dysfunction).   4. There is mild left ventricular hypertrophy.   5. Normal right ventricular size and function.   6. There is no evidence of pericardial effusion.   7. Mild mitral annular calcification.   8. Trace mitral valve regurgitation.   9. Sclerotic aortic valve with normal opening.                                        [facility] Stroke Team  CC: headache   HPI:  63M has presented with 1 week of headaches. Took aspirin daily on his own but stopped few weeks ago and began Tumeric. The headaches are constant, they are worse when he is laying down. His PCP sent him for an MRI, MRA for a headache work up which revealed a right occipital region hemorrhage with surrounding vasogenic edema and subarachnoid blood. Pt directed to ED for further eval. ED got CT scan that shows right parietal SAH. HH1, MF 0. MRS 0        PAST MEDICAL & SURGICAL HISTORY:  No pertinent past medical history  No significant past surgical history    MEDICATIONS  (STANDING):  chlorhexidine 2% Cloths 1 Application(s) Topical daily  enoxaparin Injectable 40 milliGRAM(s) SubCutaneous every 24 hours  levETIRAcetam  IVPB 500 milliGRAM(s) IV Intermittent every 12 hours  mupirocin 2% Nasal 1 Application(s) Both Nostrils two times a day  niCARdipine Infusion 5 mG/Hr (25 mL/Hr) IV Continuous <Continuous>    MEDICATIONS  (PRN):  acetaminophen     Tablet .. 650 milliGRAM(s) Oral every 6 hours PRN Temp greater or equal to 38C (100.4F), Mild Pain (1 - 3)  hydrALAZINE Injectable 10 milliGRAM(s) IV Push every 2 hours PRN SBP > 160  labetalol Injectable 10 milliGRAM(s) IV Push every 2 hours PRN SBP > 140  labetalol Injectable 10 milliGRAM(s) IV Push every 2 hours PRN SBP > 160    Allergies  No Known Allergies    SOCIAL HISTORY:  no tob,   no alcohol   no drugs    FAMILY HISTORY:  Mother PPM     ROS: 14 point ROS negative other than what is present in HPI or below    Vital Signs Last 24 Hrs  T(C): 36.7 (16 Jun 2023 11:29), Max: 36.9 (16 Jun 2023 04:26)  T(F): 98 (16 Jun 2023 11:29), Max: 98.4 (16 Jun 2023 04:26)  HR: 64 (16 Jun 2023 11:00) (46 - 73)  BP: 135/73 (16 Jun 2023 11:00) (111/58 - 153/91)  BP(mean): 92 (16 Jun 2023 11:00) (70 - 112)  RR: 19 (16 Jun 2023 11:00) (11 - 25)  SpO2: 99% (16 Jun 2023 11:00) (96% - 100%)    Parameters below as of 16 Jun 2023 08:00  Patient On (Oxygen Delivery Method): room air    General: NAD    Detailed Neurologic Exam:    Mental status: The patient is awake and alert and has normal attention span.  The patient is fully oriented in 3 spheres. The patient is oriented to current events. The patient is able to name objects, follow commands, repeat sentences.    Cranial nerves: left facial palsy, Pupils equal and react symmetrically to light 3mm b/l slightly sluggish but symmetric b/l (history of bilateral eye surgery). There is no visual field deficit to confrontation. Extraocular motion is full with no nystagmus. There is no ptosis. Facial sensation is intact.  Palate elevates symmetrically. Tongue is midline.    Motor: There is normal bulk and tone.  There is no tremor.  Strength is 5/5 in the right arm and leg.   Strength is 5/5 in the left arm and leg.    Sensation: Intact to light touch and pin in 4 extremities     Cerebellar: There is no dysmetria on finger to nose testing.    Gait : deferred    [facility] SS:  DATE: 6/16/23  TIME:1145  1A: Level of consciousness (0-3):   1B: Questions (0-2):   1C: Commands (0-2):   2: Gaze (0-2):   3: Visual fields (0-3):   4: Facial palsy (0-3): 1  MOTOR:  5A: Left arm motor drift (0-4):   5B: Right arm motor drift (0-4):   6A: Left leg motor drift (0-4):   6B: Right leg motor drift (0-4):   7: Limb ataxia (0-2):   SENSORY:  8: Sensation (0-2):   SPEECH:  9: Language (0-3):   10: Dysarthria (0-2):   EXTINCTION:  11: Extinction/inattention (0-2):     TOTAL SCORE: 1    prehospital mRS= 0      LABS:                         15.7   7.38  )-----------( 176      ( 16 Jun 2023 03:38 )             44.8       06-16    143  |  107  |  14.0  ----------------------------<  95  4.3   |  25.0  |  1.28    Ca    8.9      16 Jun 2023 03:38  Phos  3.1     06-16  Mg     2.2     06-16    TPro  7.1  /  Alb  4.3  /  TBili  0.3<L>  /  DBili  x   /  AST  30  /  ALT  34  /  AlkPhos  88  06-14      PT/INR - ( 15 Juan 2023 03:15 )   PT: 11.5 sec;   INR: 0.99 ratio         PTT - ( 14 Jun 2023 14:49 )  PTT:33.8 sec    Lipid Profile in AM (06.15.23 @ 03:15)    Cholesterol, Serum: 183 mg/dL   Triglycerides, Serum: 131 mg/dL   HDL Cholesterol, Serum: 44 mg/dL   Non HDL Cholesterol: 139    LDL Cholesterol Calculated: 113 mg/dL    A1C with Estimated Average Glucose in AM (06.15.23 @ 03:15)    A1C with Estimated Average Glucose Result: 5.6 %   Estimated Average Glucose: 114 mg/dL    RADIOLOGY & ADDITIONAL STUDIES (independently reviewed unless otherwise noted):  CT Angio Head/ Neck w/ IV Cont (06.15.23 @ 01:17)   Evolving hypodensity/edema in the right parietal lobe suggesting subacute   infarct. Possible underlying space occupying lesion measuring 1.4 cm.   Contrast-enhanced MRI of the brain is recommended  No large vessel occlusion. No evidence of intracranial arterial aneurysm   or AVM.  Moderate stenosis of the right MCA M1 segmenta and mild stenosis of the   left MCA M1 segment.  Atherosclerotic disease of the PCAs  No evidence of dural sinus thrombosis.    CT Head No Cont (06.14.23 @ 14:25)   Subtle hypoattenuation in the right parieto-occipital lobe with   superimposed gyriform high attenuation, the combination of findings are   most concerningfor a subacute infarct with associated cortical petechial   hemorrhage. Correlate with MRI. No hydrocephalus, significant mass   effect, midline shift, or herniation.     TTE Echo Complete w/o Contrast w/ Doppler (06.14.23 @ 21:02)   Summary:   1. Left ventricular ejection fraction, by visual estimation, is 65 to   70%.   2. Normal global left ventricular systolic function.   3. Spectral Doppler shows impaired relaxation pattern of left   ventricular myocardial filling (Grade I diastolic dysfunction).   4. There is mild left ventricular hypertrophy.   5. Normal right ventricular size and function.   6. There is no evidence of pericardial effusion.   7. Mild mitral annular calcification.   8. Trace mitral valve regurgitation.   9. Sclerotic aortic valve with normal opening.                                        [facility] Stroke Team  CC: headache   HPI:  63M has presented with 1 week of headaches. Took aspirin daily on his own but stopped few weeks ago and began Tumeric. The headaches are constant, they are worse when he is laying down. His PCP sent him for an MRI, MRA for a headache work up which revealed a right occipital region hemorrhage with surrounding vasogenic edema and subarachnoid blood. Pt directed to ED for further eval. ED got CT scan that shows right parietal SAH. HH1, MF 0. MRS 0        PAST MEDICAL & SURGICAL HISTORY:  No pertinent past medical history  No significant past surgical history    MEDICATIONS  (STANDING):  chlorhexidine 2% Cloths 1 Application(s) Topical daily  enoxaparin Injectable 40 milliGRAM(s) SubCutaneous every 24 hours  levETIRAcetam  IVPB 500 milliGRAM(s) IV Intermittent every 12 hours  mupirocin 2% Nasal 1 Application(s) Both Nostrils two times a day  niCARdipine Infusion 5 mG/Hr (25 mL/Hr) IV Continuous <Continuous>    MEDICATIONS  (PRN):  acetaminophen     Tablet .. 650 milliGRAM(s) Oral every 6 hours PRN Temp greater or equal to 38C (100.4F), Mild Pain (1 - 3)  hydrALAZINE Injectable 10 milliGRAM(s) IV Push every 2 hours PRN SBP > 160  labetalol Injectable 10 milliGRAM(s) IV Push every 2 hours PRN SBP > 140  labetalol Injectable 10 milliGRAM(s) IV Push every 2 hours PRN SBP > 160    Allergies  No Known Allergies    SOCIAL HISTORY:  no tob,   no alcohol   no drugs    FAMILY HISTORY:  Mother PPM     ROS: 14 point ROS negative other than what is present in HPI or below    Vital Signs Last 24 Hrs  T(C): 36.7 (16 Jun 2023 11:29), Max: 36.9 (16 Jun 2023 04:26)  T(F): 98 (16 Jun 2023 11:29), Max: 98.4 (16 Jun 2023 04:26)  HR: 64 (16 Jun 2023 11:00) (46 - 73)  BP: 135/73 (16 Jun 2023 11:00) (111/58 - 153/91)  BP(mean): 92 (16 Jun 2023 11:00) (70 - 112)  RR: 19 (16 Jun 2023 11:00) (11 - 25)  SpO2: 99% (16 Jun 2023 11:00) (96% - 100%)    Parameters below as of 16 Jun 2023 08:00  Patient On (Oxygen Delivery Method): room air    General: NAD    Detailed Neurologic Exam:    Mental status: The patient is awake and alert and has normal attention span.  The patient is fully oriented in 3 spheres. The patient is oriented to current events. The patient is able to name objects, follow commands, repeat sentences.    Cranial nerves: left facial palsy, Pupils equal and react symmetrically to light 3mm b/l slightly sluggish but symmetric b/l (history of bilateral eye surgery). There is no visual field deficit to confrontation. Extraocular motion is full with no nystagmus. There is no ptosis. Facial sensation is intact.  Palate elevates symmetrically. Tongue is midline.    Motor: There is normal bulk and tone.  There is no tremor.  Strength is 5/5 in the right arm and leg.   Strength is 5/5 in the left arm and leg.    Sensation: Intact to light touch and pin in 4 extremities     Cerebellar: There is no dysmetria on finger to nose testing.    Gait : deferred    [facility] SS:  DATE: 6/16/23  TIME:1145  1A: Level of consciousness (0-3):   1B: Questions (0-2):   1C: Commands (0-2):   2: Gaze (0-2):   3: Visual fields (0-3):   4: Facial palsy (0-3): 1  MOTOR:  5A: Left arm motor drift (0-4):   5B: Right arm motor drift (0-4):   6A: Left leg motor drift (0-4):   6B: Right leg motor drift (0-4):   7: Limb ataxia (0-2):   SENSORY:  8: Sensation (0-2):   SPEECH:  9: Language (0-3):   10: Dysarthria (0-2):   EXTINCTION:  11: Extinction/inattention (0-2):     TOTAL SCORE: 1    prehospital mRS= 0      LABS:                         15.7   7.38  )-----------( 176      ( 16 Jun 2023 03:38 )             44.8       06-16    143  |  107  |  14.0  ----------------------------<  95  4.3   |  25.0  |  1.28    Ca    8.9      16 Jun 2023 03:38  Phos  3.1     06-16  Mg     2.2     06-16    TPro  7.1  /  Alb  4.3  /  TBili  0.3<L>  /  DBili  x   /  AST  30  /  ALT  34  /  AlkPhos  88  06-14      PT/INR - ( 15 Juan 2023 03:15 )   PT: 11.5 sec;   INR: 0.99 ratio         PTT - ( 14 Jun 2023 14:49 )  PTT:33.8 sec    Lipid Profile in AM (06.15.23 @ 03:15)    Cholesterol, Serum: 183 mg/dL   Triglycerides, Serum: 131 mg/dL   HDL Cholesterol, Serum: 44 mg/dL   Non HDL Cholesterol: 139    LDL Cholesterol Calculated: 113 mg/dL    A1C with Estimated Average Glucose in AM (06.15.23 @ 03:15)    A1C with Estimated Average Glucose Result: 5.6 %   Estimated Average Glucose: 114 mg/dL    RADIOLOGY & ADDITIONAL STUDIES (independently reviewed unless otherwise noted):  CT Angio Head/ Neck w/ IV Cont (06.15.23 @ 01:17)   Evolving hypodensity/edema in the right parietal lobe suggesting subacute   infarct. Possible underlying space occupying lesion measuring 1.4 cm.   Contrast-enhanced MRI of the brain is recommended  No large vessel occlusion. No evidence of intracranial arterial aneurysm   or AVM.  Moderate stenosis of the right MCA M1 segmenta and mild stenosis of the   left MCA M1 segment.  Atherosclerotic disease of the PCAs  No evidence of dural sinus thrombosis.    CT Head No Cont (06.14.23 @ 14:25)   Subtle hypoattenuation in the right parieto-occipital lobe with   superimposed gyriform high attenuation, the combination of findings are   most concerningfor a subacute infarct with associated cortical petechial   hemorrhage. Correlate with MRI. No hydrocephalus, significant mass   effect, midline shift, or herniation.     TTE Echo Complete w/o Contrast w/ Doppler (06.14.23 @ 21:02)   Summary:   1. Left ventricular ejection fraction, by visual estimation, is 65 to   70%.   2. Normal global left ventricular systolic function.   3. Spectral Doppler shows impaired relaxation pattern of left   ventricular myocardial filling (Grade I diastolic dysfunction).   4. There is mild left ventricular hypertrophy.   5. Normal right ventricular size and function.   6. There is no evidence of pericardial effusion.   7. Mild mitral annular calcification.   8. Trace mitral valve regurgitation.   9. Sclerotic aortic valve with normal opening.

## 2023-06-16 NOTE — CHART NOTE - NSCHARTNOTEFT_GEN_A_CORE
This is a 63 year old male, no PMH, had been having one week of headaches, worse in supine position. PCP (his daughter) sent him for an MRI, MRA with contrast for a headache work up which revealed a right occipital region hemorrhage with surrounding vasogenic edema and subarachnoid blood. Pt directed to ED for further eval. ED got CT scan that shows right parietal SAH.     HH1, MF 0. MRS 0       6/15: Had CTA read as possible space occupying lesion vs subacute infarct. Also noted is diffuse atherosclerotic disease with moderate stenosis of right M1. No aneurysm or AVM, no dural sinus thrombosis.    6/15: Had Diagnostic Cerebral Angiogram which is negative for vascular abnormality.    PLAN:     Neuro:   - q4 neurochecks/q4 vital checks  - Keppra 500 BID X 7 days  - angio 6/15 is negative    BP:   - Off Cardene   - Started Amlodipine 5 mg   - Blood Pressure controlled   - PRN: labetolol/hydralazine    Resp: RA   no issues    GI:   DASH  Protonix 40: for dyspepsia   LBM 6/13    :   - voiding   - monitor for CHANO  - unremarkable renal US    Heme:   SCD    ID:   afebrile         Patient stable for downgrade to medical floor. Patient signed out to neurosurgical team. Discussed diagnosis, interventions, testing, results and pending workup. All questions answered. This is a 63 year old male, no PMH, had been having one week of headaches, worse in supine position. PCP (his daughter) sent him for an MRI, MRA with contrast for a headache work up which revealed a right occipital region hemorrhage with surrounding vasogenic edema and subarachnoid blood. Pt directed to ED for further eval. ED got CT scan that shows right parietal SAH.     HH1, MF 0. MRS 0       6/15: Had CTA read as possible space occupying lesion vs subacute infarct. Also noted is diffuse atherosclerotic disease with moderate stenosis of right M1. No aneurysm or AVM, no dural sinus thrombosis.    6/15: Had Diagnostic Cerebral Angiogram which is negative for vascular abnormality, diffuse atherosclerosis.     PLAN:     Neuro:   - q4 neurochecks/q4 vital checks  - Keppra 500 BID X 7 days  - angio 6/15 is negative    BP:   - Off Cardene   - Started Amlodipine 5 mg   - Blood Pressure controlled   - PRN: labetolol/hydralazine    Resp: RA   no issues    GI:   DASH  Protonix 40: for dyspepsia   LBM 6/13    :   - voiding   - monitor for CHANO post contrast   - unremarkable renal US    Heme:   SCD  Started Lovenox 40 today 6/16  Eventual ASA for athero when cleared from neurosurgical team    ID:   afebrile     Patient stable for downgrade to medical floor. Patient signed out to neurosurgical team. Discussed diagnosis, interventions, testing, results and pending workup. All questions answered.

## 2023-06-16 NOTE — DIETITIAN INITIAL EVALUATION ADULT - PERTINENT LABORATORY DATA
06-16    143  |  107  |  14.0  ----------------------------<  95  4.3   |  25.0  |  1.28    Ca    8.9      16 Jun 2023 03:38  Phos  3.1     06-16  Mg     2.2     06-16    TPro  7.1  /  Alb  4.3  /  TBili  0.3<L>  /  DBili  x   /  AST  30  /  ALT  34  /  AlkPhos  88  06-14  A1C with Estimated Average Glucose Result: 5.6 % (06-15-23 @ 03:15)

## 2023-06-16 NOTE — DIETITIAN INITIAL EVALUATION ADULT - ENERGY INTAKE
Pt currently on a DASH/TLC diet. Good appetite/po intake. Lunch tray observed at bedside, pt ate very well.  Adequate (%)

## 2023-06-16 NOTE — CONSULT NOTE ADULT - ASSESSMENT
ASSESSMENT: 63M has presented with 1 week of headaches. Took aspirin daily on his own but stopped few weeks ago and began Tumeric. PCP sent patient for MRI, MRA for a headache work up which revealed a right occipital region hemorrhage with surrounding vasogenic edema and subarachnoid hemorrhage.  CT head with right parietal hypodensity concerning for subacute infarction and associated hemorrhage. Cerebral angiogram demonstrates no underlying vascular lesion but does note upon review with neuro IR - intracranial atherosclerotic disease.  MRI pending for further differentiation.     NEURO: Continue close monitoring for neurologic deterioration , stroke neuro checks q 1,   SBP goal 120-150mmHg titrate statin  to LDL Less than 70, MRI Brain w/ w/o pending, neuro IR following, Physical therapy/OT/Speech eval/treatment.     ANTITHROMBOTIC THERAPY: none at this time in setting of ICH and increased risk of bleeding, further plan pending findings of noted workup     PULMONARY: protecting airway, saturating well     CARDIOVASCULAR:  TTE as noted, cardiac monitoring                             GASTROINTESTINAL:  dysphagia screen  pass    RENAL: BUN/Cr within range,  monitor urine output, maintain adequate hydration       Na Goal:  135-145     HEMATOLOGY: H/H without anemia, Platelets 176, patient should have all age and risk appropriate malignancy screenings with PCP or sooner if clinically suspected      DVT ppx: Heparin s.c [] LMWH [x]     ID: afebrile, no leukocytosis, monitor for si/sx of infection     OTHER:  condition and plan of care d/w patient, questsions and concerns addressed. Noted daughter got bp cuff for home monitoring.     DISPOSITION: Rehab or home depending on PT eval once stable and workup is complete      CORE MEASURES:        Admission NIHSS:      Tenecteplase : [] YES [x] NO      LDL/HDL/A1C: 113/44/5.6     Depression Screen- if depression hx and/or present      Statin Therapy: as noted      Dysphagia Screen: [x] PASS [] FAIL     Smoking [] YES [x] NO      Afib [] YES [x] NO     Stroke Education [x] YES [] NO    Obtain screening lower extremity venous ultrasound in patients who meet 1 or more of the following criteria as patient is high risk for DVT/PE on admission:   [] History of DVT/PE  []Hypercoagulable states (Factor V Leiden, Cancer, OCP, etc. )  []Prolonged immobility (hemiplegia/hemiparesis/post operative or any other extended immobilization)  [] Transferred from outside facility (Rehab or Long term care)  [] Age </= to 50

## 2023-06-16 NOTE — DIETITIAN INITIAL EVALUATION ADULT - OTHER INFO
63M has had 1 week of headaches. The headaches are constant, they are worse when he is laying down. His PCP sent him for an MRI, MRA for a headache work up which revealed a right occipital region hemorrhage with surrounding vasogenic edema and subarachnoid blood. Pt directed to ED for further eval. ED got CT scan that shows right parietal SAH.

## 2023-06-16 NOTE — PROGRESS NOTE ADULT - ASSESSMENT
63M has had 1 week of headaches. The headaches are constant, they are worse when he is laying down. His PCP sent him for an MRI, MRA for a headache work up which revealed a right occipital region hemorrhage with surrounding vasogenic edema and subarachnoid blood. Pt directed to ED for further eval. ED got CT scan that shows right parietal SAH. HH1, MF 0. MRS 0 (14 Jun 2023 15:23) In Ed placed on nicardipine.     Neuro:   -q4 neurochecks/q1 vital checks  - nimodipine  - TCDs tomorrow   -Keppra 500 BID  -angio- neg    BP:   - start norvasc  - PRN: labetolol    Resp: RA   satting well     GI:   NPO for angio  Protonix 40: for dypepsia   PRN: zofran   LBM 6/13    :   - voiding   - monitor for CHANO  - unremarkable renal US    Heme:   SCD    ID:   afebrile  63M has had 1 week of headaches. The headaches are constant, they are worse when he is laying down. His PCP sent him for an MRI, MRA for a headache work up which revealed a right occipital region hemorrhage with surrounding vasogenic edema and subarachnoid blood. Pt directed to ED for further eval. ED got CT scan that shows right parietal SAH. HH1, MF 0. MRS 0 (14 Jun 2023 15:23) In Ed placed on nicardipine.     Neuro:   -q4 neurochecks   - TCDs  -Keppra 500 BID  -angio- neg    BP:   - start norvasc  - PRN: labetolol    Resp: RA   satting well     GI:   adv diet as tolerated  Protonix 40: for dypepsia   PRN: zofran   LBM 6/13    :   - voiding   - monitor for CHANO  - unremarkable renal US    Heme:   SCD    ID:   afebrile

## 2023-06-16 NOTE — PROGRESS NOTE ADULT - ASSESSMENT
63M with non traumatic SAH     Plan:  - Continue q4 neuro checks   - SBP , controlled off of cardene  - ADAT  - SCDS only for dvt ppx  - Voiding   -Final plan pending AM rounds

## 2023-06-16 NOTE — PROGRESS NOTE ADULT - SUBJECTIVE AND OBJECTIVE BOX
Chief complaint:   Patient is a 63y old  Male who presents with a chief complaint of SAH (16 Jun 2023 00:39)    HPI:  63M has had 1 week of headaches. The headaches are constant, they are worse when he is laying down. His PCP sent him for an MRI, MRA for a headache work up which revealed a right occipital region hemorrhage with surrounding vasogenic edema and subarachnoid blood. Pt directed to ED for further eval. ED got CT scan that shows right parietal SAH. HH1, MF 0. MRS 0 (14 Jun 2023 15:23)        24hr EVENTS:      ROS: [ ]  Unable to assess due to mental status   All other systems negative    -----------------------------------------------------------------------------------------------------------------------------------------------------------------------------------  ICU Vital Signs Last 24 Hrs  T(C): 36.7 (16 Jun 2023 07:26), Max: 36.9 (16 Jun 2023 04:26)  T(F): 98.1 (16 Jun 2023 07:26), Max: 98.4 (16 Jun 2023 04:26)  HR: 47 (16 Jun 2023 07:00) (46 - 68)  BP: 143/92 (16 Jun 2023 07:00) (111/58 - 150/79)  BP(mean): 103 (16 Jun 2023 07:00) (70 - 110)  ABP: --  ABP(mean): --  RR: 14 (16 Jun 2023 07:00) (11 - 25)  SpO2: 100% (16 Jun 2023 07:00) (96% - 100%)    O2 Parameters below as of 16 Jun 2023 04:00  Patient On (Oxygen Delivery Method): room air            I&O's Summary    15 Juan 2023 07:01  -  16 Jun 2023 07:00  --------------------------------------------------------  IN: 2715 mL / OUT: 1275 mL / NET: 1440 mL    16 Jun 2023 07:01  -  16 Jun 2023 08:35  --------------------------------------------------------  IN: 315 mL / OUT: 150 mL / NET: 165 mL        MEDICATIONS  (STANDING):  amLODIPine   Tablet 5 milliGRAM(s) Oral once  chlorhexidine 2% Cloths 1 Application(s) Topical daily  levETIRAcetam  IVPB 500 milliGRAM(s) IV Intermittent every 12 hours  mupirocin 2% Nasal 1 Application(s) Both Nostrils two times a day  niCARdipine Infusion 5 mG/Hr (25 mL/Hr) IV Continuous <Continuous>      RESPIRATORY:        IMAGING:   Recent imaging studies were reviewed.    LAB RESULTS:                          15.7   7.38  )-----------( 176      ( 16 Jun 2023 03:38 )             44.8       PT/INR - ( 15 Juan 2023 03:15 )   PT: 11.5 sec;   INR: 0.99 ratio         PTT - ( 14 Jun 2023 14:49 )  PTT:33.8 sec    06-16    143  |  107  |  14.0  ----------------------------<  95  4.3   |  25.0  |  1.28    Ca    8.9      16 Jun 2023 03:38  Phos  3.1     06-16  Mg     2.2     06-16    TPro  7.1  /  Alb  4.3  /  TBili  0.3<L>  /  DBili  x   /  AST  30  /  ALT  34  /  AlkPhos  88  06-14        -----------------------------------------------------------------------------------------------------------------------------------------------------------------------------------    PHYSICAL EXAM:  General: Calm, laying in bed  HEENT: MMM  Neuro:  -Mental status- No acute distress, AOx3, conversational, following commands  -CN- PERRL 3mm, EOMI, tongue midline, face symmetric  -Motor- full strength in all ext  -Sensation- intact to LT   -Coordination- no dysmetria noted    CV:   Pulm: Clear to auscultation  Abd: Soft, nontender, nondistended  Ext: No edema  Skin: warm, dry     Chief complaint:   Patient is a 63y old  Male who presents with a chief complaint of SAH (16 Jun 2023 00:39)    HPI:  63M has had 1 week of headaches. The headaches are constant, they are worse when he is laying down. His PCP sent him for an MRI, MRA for a headache work up which revealed a right occipital region hemorrhage with surrounding vasogenic edema and subarachnoid blood. Pt directed to ED for further eval. ED got CT scan that shows right parietal SAH. HH1, MF 0. MRS 0 (14 Jun 2023 15:23)    24hr EVENTS:  no complaints    ROS: no pain  All other systems negative    -----------------------------------------------------------------------------------------------------------------------------------------------------------------------------------  ICU Vital Signs Last 24 Hrs  T(C): 36.7 (16 Jun 2023 07:26), Max: 36.9 (16 Jun 2023 04:26)  T(F): 98.1 (16 Jun 2023 07:26), Max: 98.4 (16 Jun 2023 04:26)  HR: 47 (16 Jun 2023 07:00) (46 - 68)  BP: 143/92 (16 Jun 2023 07:00) (111/58 - 150/79)  BP(mean): 103 (16 Jun 2023 07:00) (70 - 110)  ABP: --  ABP(mean): --  RR: 14 (16 Jun 2023 07:00) (11 - 25)  SpO2: 100% (16 Jun 2023 07:00) (96% - 100%)    O2 Parameters below as of 16 Jun 2023 04:00  Patient On (Oxygen Delivery Method): room air            I&O's Summary    15 Juan 2023 07:01  -  16 Jun 2023 07:00  --------------------------------------------------------  IN: 2715 mL / OUT: 1275 mL / NET: 1440 mL    16 Jun 2023 07:01  -  16 Jun 2023 08:35  --------------------------------------------------------  IN: 315 mL / OUT: 150 mL / NET: 165 mL        MEDICATIONS  (STANDING):  amLODIPine   Tablet 5 milliGRAM(s) Oral once  chlorhexidine 2% Cloths 1 Application(s) Topical daily  levETIRAcetam  IVPB 500 milliGRAM(s) IV Intermittent every 12 hours  mupirocin 2% Nasal 1 Application(s) Both Nostrils two times a day      IMAGING:   Recent imaging studies were reviewed.    LAB RESULTS:                          15.7   7.38  )-----------( 176      ( 16 Jun 2023 03:38 )             44.8       PT/INR - ( 15 Juan 2023 03:15 )   PT: 11.5 sec;   INR: 0.99 ratio         PTT - ( 14 Jun 2023 14:49 )  PTT:33.8 sec    06-16    143  |  107  |  14.0  ----------------------------<  95  4.3   |  25.0  |  1.28    Ca    8.9      16 Jun 2023 03:38  Phos  3.1     06-16  Mg     2.2     06-16    TPro  7.1  /  Alb  4.3  /  TBili  0.3<L>  /  DBili  x   /  AST  30  /  ALT  34  /  AlkPhos  88  06-14        -----------------------------------------------------------------------------------------------------------------------------------------------------------------------------------    PHYSICAL EXAM:  General: Calm, laying in bed  HEENT: MMM  Neuro:  -Mental status- No acute distress, AOx3, conversational, following commands  -CN- PERRL 3mm, EOMI, tongue midline, face symmetric  -Motor- full strength in all ext  -Sensation- intact to LT   -Coordination- no dysmetria noted    CV: RRR  Pulm: Clear to auscultation  Abd: Soft, nontender, nondistended  Ext: No edema  Skin: warm, dry

## 2023-06-17 LAB
ANION GAP SERPL CALC-SCNC: 12 MMOL/L — SIGNIFICANT CHANGE UP (ref 5–17)
BUN SERPL-MCNC: 20.6 MG/DL — HIGH (ref 8–20)
CALCIUM SERPL-MCNC: 9.2 MG/DL — SIGNIFICANT CHANGE UP (ref 8.4–10.5)
CHLORIDE SERPL-SCNC: 105 MMOL/L — SIGNIFICANT CHANGE UP (ref 96–108)
CO2 SERPL-SCNC: 24 MMOL/L — SIGNIFICANT CHANGE UP (ref 22–29)
CREAT SERPL-MCNC: 1.47 MG/DL — HIGH (ref 0.5–1.3)
EGFR: 53 ML/MIN/1.73M2 — LOW
GLUCOSE SERPL-MCNC: 109 MG/DL — HIGH (ref 70–99)
HCT VFR BLD CALC: 46 % — SIGNIFICANT CHANGE UP (ref 39–50)
HGB BLD-MCNC: 15.8 G/DL — SIGNIFICANT CHANGE UP (ref 13–17)
MAGNESIUM SERPL-MCNC: 2 MG/DL — SIGNIFICANT CHANGE UP (ref 1.6–2.6)
MCHC RBC-ENTMCNC: 30.2 PG — SIGNIFICANT CHANGE UP (ref 27–34)
MCHC RBC-ENTMCNC: 34.3 GM/DL — SIGNIFICANT CHANGE UP (ref 32–36)
MCV RBC AUTO: 88 FL — SIGNIFICANT CHANGE UP (ref 80–100)
PHOSPHATE SERPL-MCNC: 3.5 MG/DL — SIGNIFICANT CHANGE UP (ref 2.4–4.7)
PLATELET # BLD AUTO: 201 K/UL — SIGNIFICANT CHANGE UP (ref 150–400)
POTASSIUM SERPL-MCNC: 4.4 MMOL/L — SIGNIFICANT CHANGE UP (ref 3.5–5.3)
POTASSIUM SERPL-SCNC: 4.4 MMOL/L — SIGNIFICANT CHANGE UP (ref 3.5–5.3)
RBC # BLD: 5.23 M/UL — SIGNIFICANT CHANGE UP (ref 4.2–5.8)
RBC # FLD: 11.9 % — SIGNIFICANT CHANGE UP (ref 10.3–14.5)
SODIUM SERPL-SCNC: 141 MMOL/L — SIGNIFICANT CHANGE UP (ref 135–145)
WBC # BLD: 6.62 K/UL — SIGNIFICANT CHANGE UP (ref 3.8–10.5)
WBC # FLD AUTO: 6.62 K/UL — SIGNIFICANT CHANGE UP (ref 3.8–10.5)

## 2023-06-17 PROCEDURE — 99232 SBSQ HOSP IP/OBS MODERATE 35: CPT

## 2023-06-17 PROCEDURE — 70553 MRI BRAIN STEM W/O & W/DYE: CPT | Mod: 26

## 2023-06-17 RX ORDER — AMLODIPINE BESYLATE 2.5 MG/1
5 TABLET ORAL DAILY
Refills: 0 | Status: DISCONTINUED | OUTPATIENT
Start: 2023-06-17 | End: 2023-06-18

## 2023-06-17 RX ADMIN — LEVETIRACETAM 400 MILLIGRAM(S): 250 TABLET, FILM COATED ORAL at 18:09

## 2023-06-17 RX ADMIN — Medication 10 MILLIGRAM(S): at 13:41

## 2023-06-17 RX ADMIN — Medication 650 MILLIGRAM(S): at 19:00

## 2023-06-17 RX ADMIN — Medication 650 MILLIGRAM(S): at 05:47

## 2023-06-17 RX ADMIN — AMLODIPINE BESYLATE 5 MILLIGRAM(S): 2.5 TABLET ORAL at 14:19

## 2023-06-17 RX ADMIN — Medication 10 MILLIGRAM(S): at 16:11

## 2023-06-17 RX ADMIN — CHLORHEXIDINE GLUCONATE 1 APPLICATION(S): 213 SOLUTION TOPICAL at 11:39

## 2023-06-17 RX ADMIN — MUPIROCIN 1 APPLICATION(S): 20 OINTMENT TOPICAL at 18:09

## 2023-06-17 RX ADMIN — Medication 650 MILLIGRAM(S): at 06:47

## 2023-06-17 RX ADMIN — LEVETIRACETAM 400 MILLIGRAM(S): 250 TABLET, FILM COATED ORAL at 05:21

## 2023-06-17 RX ADMIN — Medication 650 MILLIGRAM(S): at 18:08

## 2023-06-17 RX ADMIN — ENOXAPARIN SODIUM 40 MILLIGRAM(S): 100 INJECTION SUBCUTANEOUS at 05:20

## 2023-06-17 RX ADMIN — MUPIROCIN 1 APPLICATION(S): 20 OINTMENT TOPICAL at 05:21

## 2023-06-17 RX ADMIN — Medication 10 MILLIGRAM(S): at 12:04

## 2023-06-17 NOTE — PROGRESS NOTE ADULT - NS_MD_PANP_GEN_ALL_CORE
No
Attending and PA/NP shared services statement (NON-critical care):
Attending and PA/NP shared services statement (NON-critical care):

## 2023-06-17 NOTE — PROGRESS NOTE ADULT - NS ATTEND AMEND GEN_ALL_CORE FT
I personally examined the pt at the bed side and talked to her wife. I explained the plan and the images in details. The pt will need a follow up visit in my clinic in 2 weeks.
agree

## 2023-06-17 NOTE — PROGRESS NOTE ADULT - SUBJECTIVE AND OBJECTIVE BOX
Neurosurgery Progress Note     HPI: 63y    63M has had 1 week of headaches. The headaches are constant, they are worse when he is laying down. His PCP sent him for an MRI, MRA for a headache work up which revealed a right occipital region hemorrhage with surrounding vasogenic edema and subarachnoid blood. Pt directed to ED for further eval. ED got CT scan that shows right parietal SAH. HH1, MF 0. MRS 0 (14 Jun 2023 15:23)      6/14/23 : CTA Brain and Neck was obtained , showed no LVO,  No Aneurysm, No AVM     6/17: Seen today Denies Headaches ambulating on the unit     MEDICATIONS  (STANDING):  amLODIPine   Tablet 5 milliGRAM(s) Oral daily  chlorhexidine 2% Cloths 1 Application(s) Topical daily  enoxaparin Injectable 40 milliGRAM(s) SubCutaneous every 24 hours  levETIRAcetam  IVPB 500 milliGRAM(s) IV Intermittent every 12 hours  mupirocin 2% Nasal 1 Application(s) Both Nostrils two times a day    MEDICATIONS  (PRN):  acetaminophen     Tablet .. 650 milliGRAM(s) Oral every 6 hours PRN Temp greater or equal to 38C (100.4F), Mild Pain (1 - 3)  hydrALAZINE Injectable 10 milliGRAM(s) IV Push every 2 hours PRN SBP > 160  labetalol Injectable 10 milliGRAM(s) IV Push every 2 hours PRN SBP > 140  labetalol Injectable 10 milliGRAM(s) IV Push every 2 hours PRN SBP > 160      Allergies    No Known Allergies    Intolerances        T(C): 36.7 (06-17-23 @ 11:39), Max: 36.9 (06-16-23 @ 15:11)  HR: 98 (06-17-23 @ 14:16) (51 - 98)  BP: 142/83 (06-17-23 @ 14:16) (142/83 - 171/86)  RR: 18 (06-17-23 @ 11:39) (18 - 18)  SpO2: 96% (06-17-23 @ 11:39) (94% - 99%)  acetaminophen     Tablet .. 650 milliGRAM(s) Oral every 6 hours PRN  amLODIPine   Tablet 5 milliGRAM(s) Oral daily  chlorhexidine 2% Cloths 1 Application(s) Topical daily  enoxaparin Injectable 40 milliGRAM(s) SubCutaneous every 24 hours  hydrALAZINE Injectable 10 milliGRAM(s) IV Push every 2 hours PRN  labetalol Injectable 10 milliGRAM(s) IV Push every 2 hours PRN  labetalol Injectable 10 milliGRAM(s) IV Push every 2 hours PRN  levETIRAcetam  IVPB 500 milliGRAM(s) IV Intermittent every 12 hours  mupirocin 2% Nasal 1 Application(s) Both Nostrils two times a day      PHYSICAL EXAM:    GENERAL: NAD, well-groomed, well-developed  HEAD:  Atraumatic, Normocephalic  EYES: EOMI, PERRLA, conjunctiva and sclera clear  NEURO:  Awake  alert oriented to self, place situation   Fund of knowledge, recent and remote memory are intact   Mood; normal affect   CN II-XII intact PERRL, EOMI, no ptosis, no Nystagmus, normal shoulder shrug and head turning   Face symmetrical tongue is midline speech is clear and fluent  Motor: 5/5 UE/LE all muscle groups   Sensory: No deficit to light touch   Gait is normal     LABS:                        15.8   6.62  )-----------( 201      ( 17 Jun 2023 04:27 )             46.0     06-17    141  |  105  |  20.6<H>  ----------------------------<  109<H>  4.4   |  24.0  |  1.47<H>    Ca    9.2      17 Jun 2023 04:27  Phos  3.5     06-17  Mg     2.0     06-17      RADIOLOGY & ADDITIONAL TESTS:      ACC: [id number] EXAM: CT ANGIO BRAIN (W)AW IC ORDERED BY: ARELI MONACO    ACC: [id number] EXAM: CT ANGIO NECK (W)AW IC ORDERED BY: LAMBERTO DOS SANTOS    ACC: [id number] EXAM: CT BRAIN ORDERED BY: RADHA CHAVARRIA    PROCEDURE DATE: 06/15/2023        INTERPRETATION: CLINICAL INDICATIONS:6 hour stability scan    TECHNIQUE: CTA brain and neck. 90cc Omnipaque 350 Intravenous contrast was administered. 2-D MIP images. 5cc of contrast was discarded.    COMPARISON: Head CT dated 6/14/2023    FINDINGS:    NONCONTRAST CT HEAD:  Evolving hypodensity in the right parietal lobe suggesting subacute infarct. Possible underlying space occupying lesion image 35 of series 3 measuring 1.4 cm. Contrast-enhanced MRI of the brain is recommended. There is no compelling evidence for an acute transcortical infarction. There is no other evidence of mass, mass effect, midline shift or extra-axial fluid collection. The lateral ventricles and cortical sulci are age-appropriate in size and configuration. The orbits, mastoid air cells and visualized paranasal sinuses are normal. The calvarium is intact.      CTA BRAIN:    Moderate stenosis of the right MCA M1 segmenta and mild stenosis of the left MCA M1 segment.  Atherosclerotic disease of the PCAs. The [place] of Schwarz and vertebrobasilar system are otherwise unremarkable without evidence of additional stenosis, occlusion or saccular aneurysm dilation. No evidence for arterial venous malformation. The vertebral arteries are codominant.      CTA NECK:  A left-sided aortic arch is demonstrated. There is normal relationship to the great vessels. The common carotid arteries, internal carotid arteries and vertebral arteries are normal in caliber. No evidence of stenosis, occlusion or saccular aneurysm dilation. The vertebral arteries are codominant.      IMPRESSION:    Evolving hypodensity/edema in the right parietal lobe suggesting subacute infarct. Possible underlying space occupying lesion measuring 1.4 cm. Contrast-enhanced MRI of the brain is recommended    No large vessel occlusion. No evidence of intracranial arterial aneurysm or AVM.    Moderate stenosis of the right MCA M1 segmenta and mild stenosis of the left MCA M1 segment.    Atherosclerotic disease of the PCAs    No evidence of dural sinus thrombosis.    Preliminary report provided by ADELFO BHARDWAJ DO; Attending Radiologist.    --- End of Report ---    NAVYA FAULKNER MD; Attending Radiologist  This document has been electronically signed. Juan 15 2023 8:31AM    A/P : 63 year old male on daily Tumeric with 1 week of progressively worsening Headaches found to have Atraumatic Right Occipital SAH on outside MRI/ MRA sent to [facility]  for further work up   CT Brain Demonstrated hypodensity in the right parietal lobe suggesting subacute infarct.  CTA Brain & CTA Neck performed no AVM , no Aneurysm , No LVO    Awaiting Follow up MRI Brain   Pt continues to require PRN Hydralazine for elevated BP   Amlodipine 5 mg started daily for HTN   Lovenox 40 mg SQ for DVT prophylaxis   Plan to follow   d/w Dr Faria  Neurosurgery Progress Note     HPI: 63y    63M has had 1 week of headaches. The headaches are constant, they are worse when he is laying down. His PCP sent him for an MRI, MRA for a headache work up which revealed a right occipital region hemorrhage with surrounding vasogenic edema and subarachnoid blood. Pt directed to ED for further eval. ED got CT scan that shows right parietal SAH. HH1, MF 0. MRS 0 (14 Jun 2023 15:23)      6/14/23 : CTA Brain and Neck was obtained , showed no LVO,  No Aneurysm, No AVM     6/17: Seen today Denies Headaches ambulating on the unit     MEDICATIONS  (STANDING):  amLODIPine   Tablet 5 milliGRAM(s) Oral daily  chlorhexidine 2% Cloths 1 Application(s) Topical daily  enoxaparin Injectable 40 milliGRAM(s) SubCutaneous every 24 hours  levETIRAcetam  IVPB 500 milliGRAM(s) IV Intermittent every 12 hours  mupirocin 2% Nasal 1 Application(s) Both Nostrils two times a day    MEDICATIONS  (PRN):  acetaminophen     Tablet .. 650 milliGRAM(s) Oral every 6 hours PRN Temp greater or equal to 38C (100.4F), Mild Pain (1 - 3)  hydrALAZINE Injectable 10 milliGRAM(s) IV Push every 2 hours PRN SBP > 160  labetalol Injectable 10 milliGRAM(s) IV Push every 2 hours PRN SBP > 140  labetalol Injectable 10 milliGRAM(s) IV Push every 2 hours PRN SBP > 160      Allergies    No Known Allergies    Intolerances        T(C): 36.7 (06-17-23 @ 11:39), Max: 36.9 (06-16-23 @ 15:11)  HR: 98 (06-17-23 @ 14:16) (51 - 98)  BP: 142/83 (06-17-23 @ 14:16) (142/83 - 171/86)  RR: 18 (06-17-23 @ 11:39) (18 - 18)  SpO2: 96% (06-17-23 @ 11:39) (94% - 99%)  acetaminophen     Tablet .. 650 milliGRAM(s) Oral every 6 hours PRN  amLODIPine   Tablet 5 milliGRAM(s) Oral daily  chlorhexidine 2% Cloths 1 Application(s) Topical daily  enoxaparin Injectable 40 milliGRAM(s) SubCutaneous every 24 hours  hydrALAZINE Injectable 10 milliGRAM(s) IV Push every 2 hours PRN  labetalol Injectable 10 milliGRAM(s) IV Push every 2 hours PRN  labetalol Injectable 10 milliGRAM(s) IV Push every 2 hours PRN  levETIRAcetam  IVPB 500 milliGRAM(s) IV Intermittent every 12 hours  mupirocin 2% Nasal 1 Application(s) Both Nostrils two times a day      PHYSICAL EXAM:    GENERAL: NAD, well-groomed, well-developed  HEAD:  Atraumatic, Normocephalic  EYES: EOMI, PERRLA, conjunctiva and sclera clear  NEURO:  Awake  alert oriented to self, place situation   Fund of knowledge, recent and remote memory are intact   Mood; normal affect   CN II-XII intact PERRL, EOMI, no ptosis, no Nystagmus, normal shoulder shrug and head turning   Face symmetrical tongue is midline speech is clear and fluent  Motor: 5/5 UE/LE all muscle groups   Sensory: No deficit to light touch   Gait is normal     LABS:                        15.8   6.62  )-----------( 201      ( 17 Jun 2023 04:27 )             46.0     06-17    141  |  105  |  20.6<H>  ----------------------------<  109<H>  4.4   |  24.0  |  1.47<H>    Ca    9.2      17 Jun 2023 04:27  Phos  3.5     06-17  Mg     2.0     06-17      RADIOLOGY & ADDITIONAL TESTS:      ACC: [id number] EXAM: CT ANGIO BRAIN (W)AW IC ORDERED BY: ARELI MONACO    ACC: [id number] EXAM: CT ANGIO NECK (W)AW IC ORDERED BY: LAMBERTO DOS SANTOS    ACC: [id number] EXAM: CT BRAIN ORDERED BY: RADHA CHAVARRIA    PROCEDURE DATE: 06/15/2023        INTERPRETATION: CLINICAL INDICATIONS:6 hour stability scan    TECHNIQUE: CTA brain and neck. 90cc Omnipaque 350 Intravenous contrast was administered. 2-D MIP images. 5cc of contrast was discarded.    COMPARISON: Head CT dated 6/14/2023    FINDINGS:    NONCONTRAST CT HEAD:  Evolving hypodensity in the right parietal lobe suggesting subacute infarct. Possible underlying space occupying lesion image 35 of series 3 measuring 1.4 cm. Contrast-enhanced MRI of the brain is recommended. There is no compelling evidence for an acute transcortical infarction. There is no other evidence of mass, mass effect, midline shift or extra-axial fluid collection. The lateral ventricles and cortical sulci are age-appropriate in size and configuration. The orbits, mastoid air cells and visualized paranasal sinuses are normal. The calvarium is intact.      CTA BRAIN:    Moderate stenosis of the right MCA M1 segmenta and mild stenosis of the left MCA M1 segment.  Atherosclerotic disease of the PCAs. The [place] of Schwarz and vertebrobasilar system are otherwise unremarkable without evidence of additional stenosis, occlusion or saccular aneurysm dilation. No evidence for arterial venous malformation. The vertebral arteries are codominant.      CTA NECK:  A left-sided aortic arch is demonstrated. There is normal relationship to the great vessels. The common carotid arteries, internal carotid arteries and vertebral arteries are normal in caliber. No evidence of stenosis, occlusion or saccular aneurysm dilation. The vertebral arteries are codominant.      IMPRESSION:    Evolving hypodensity/edema in the right parietal lobe suggesting subacute infarct. Possible underlying space occupying lesion measuring 1.4 cm. Contrast-enhanced MRI of the brain is recommended    No large vessel occlusion. No evidence of intracranial arterial aneurysm or AVM.    Moderate stenosis of the right MCA M1 segmenta and mild stenosis of the left MCA M1 segment.    Atherosclerotic disease of the PCAs    No evidence of dural sinus thrombosis.    Preliminary report provided by ADELFO BHARDWAJ DO; Attending Radiologist.    --- End of Report ---    NAVYA FAULKNER MD; Attending Radiologist  This document has been electronically signed. Juan 15 2023 8:31AM    A/P : 63 year old male on daily Tumeric with 1 week of progressively worsening Headaches found to have Atraumatic Right Occipital SAH on outside MRI/ MRA sent to [facility]  for further work up   CT Brain Demonstrated hypodensity in the right parietal lobe suggesting subacute infarct.  CTA Brain & CTA Neck performed no AVM , no Aneurysm , No LVO    Awaiting Follow up MRI Brain   Pt continues to require PRN Hydralazine for elevated BP   Amlodipine 5 mg started daily for HTN   Lovenox 40 mg SQ for DVT prophylaxis   Plan to follow   d/w Dr Faria  Neurosurgery Progress Note     HPI: 63y    63M has had 1 week of headaches. The headaches are constant, they are worse when he is laying down. His PCP sent him for an MRI, MRA for a headache work up which revealed a right occipital region hemorrhage with surrounding vasogenic edema and subarachnoid blood. Pt directed to ED for further eval. ED got CT scan that shows right parietal SAH. HH1, MF 0. MRS 0 (14 Jun 2023 15:23)      6/14/23 : CTA Brain and Neck was obtained , showed no LVO,  No Aneurysm, No AVM     6/17: Seen today Denies Headaches ambulating on the unit     MEDICATIONS  (STANDING):  amLODIPine   Tablet 5 milliGRAM(s) Oral daily  chlorhexidine 2% Cloths 1 Application(s) Topical daily  enoxaparin Injectable 40 milliGRAM(s) SubCutaneous every 24 hours  levETIRAcetam  IVPB 500 milliGRAM(s) IV Intermittent every 12 hours  mupirocin 2% Nasal 1 Application(s) Both Nostrils two times a day    MEDICATIONS  (PRN):  acetaminophen     Tablet .. 650 milliGRAM(s) Oral every 6 hours PRN Temp greater or equal to 38C (100.4F), Mild Pain (1 - 3)  hydrALAZINE Injectable 10 milliGRAM(s) IV Push every 2 hours PRN SBP > 160  labetalol Injectable 10 milliGRAM(s) IV Push every 2 hours PRN SBP > 140  labetalol Injectable 10 milliGRAM(s) IV Push every 2 hours PRN SBP > 160      Allergies    No Known Allergies    Intolerances        T(C): 36.7 (06-17-23 @ 11:39), Max: 36.9 (06-16-23 @ 15:11)  HR: 98 (06-17-23 @ 14:16) (51 - 98)  BP: 142/83 (06-17-23 @ 14:16) (142/83 - 171/86)  RR: 18 (06-17-23 @ 11:39) (18 - 18)  SpO2: 96% (06-17-23 @ 11:39) (94% - 99%)  acetaminophen     Tablet .. 650 milliGRAM(s) Oral every 6 hours PRN  amLODIPine   Tablet 5 milliGRAM(s) Oral daily  chlorhexidine 2% Cloths 1 Application(s) Topical daily  enoxaparin Injectable 40 milliGRAM(s) SubCutaneous every 24 hours  hydrALAZINE Injectable 10 milliGRAM(s) IV Push every 2 hours PRN  labetalol Injectable 10 milliGRAM(s) IV Push every 2 hours PRN  labetalol Injectable 10 milliGRAM(s) IV Push every 2 hours PRN  levETIRAcetam  IVPB 500 milliGRAM(s) IV Intermittent every 12 hours  mupirocin 2% Nasal 1 Application(s) Both Nostrils two times a day      PHYSICAL EXAM:    GENERAL: NAD, well-groomed, well-developed  HEAD:  Atraumatic, Normocephalic  EYES: EOMI, PERRLA, conjunctiva and sclera clear  NEURO:  Awake  alert oriented to self, place situation   Fund of knowledge, recent and remote memory are intact   Mood; normal affect   CN II-XII intact PERRL, EOMI, no ptosis, no Nystagmus, normal shoulder shrug and head turning   Face symmetrical tongue is midline speech is clear and fluent  Motor: 5/5 UE/LE all muscle groups   Sensory: No deficit to light touch   Gait is normal     LABS:                        15.8   6.62  )-----------( 201      ( 17 Jun 2023 04:27 )             46.0     06-17    141  |  105  |  20.6<H>  ----------------------------<  109<H>  4.4   |  24.0  |  1.47<H>    Ca    9.2      17 Jun 2023 04:27  Phos  3.5     06-17  Mg     2.0     06-17      RADIOLOGY & ADDITIONAL TESTS:      ACC: [id number] EXAM: CT ANGIO BRAIN (W)AW IC ORDERED BY: ARELI MONACO    ACC: [id number] EXAM: CT ANGIO NECK (W)AW IC ORDERED BY: LAMBERTO DOS SANTOS    ACC: [id number] EXAM: CT BRAIN ORDERED BY: RADHA CHAVARRIA    PROCEDURE DATE: 06/15/2023        INTERPRETATION: CLINICAL INDICATIONS:6 hour stability scan    TECHNIQUE: CTA brain and neck. 90cc Omnipaque 350 Intravenous contrast was administered. 2-D MIP images. 5cc of contrast was discarded.    COMPARISON: Head CT dated 6/14/2023    FINDINGS:    NONCONTRAST CT HEAD:  Evolving hypodensity in the right parietal lobe suggesting subacute infarct. Possible underlying space occupying lesion image 35 of series 3 measuring 1.4 cm. Contrast-enhanced MRI of the brain is recommended. There is no compelling evidence for an acute transcortical infarction. There is no other evidence of mass, mass effect, midline shift or extra-axial fluid collection. The lateral ventricles and cortical sulci are age-appropriate in size and configuration. The orbits, mastoid air cells and visualized paranasal sinuses are normal. The calvarium is intact.      CTA BRAIN:    Moderate stenosis of the right MCA M1 segmenta and mild stenosis of the left MCA M1 segment.  Atherosclerotic disease of the PCAs. The [place] of Schwarz and vertebrobasilar system are otherwise unremarkable without evidence of additional stenosis, occlusion or saccular aneurysm dilation. No evidence for arterial venous malformation. The vertebral arteries are codominant.      CTA NECK:  A left-sided aortic arch is demonstrated. There is normal relationship to the great vessels. The common carotid arteries, internal carotid arteries and vertebral arteries are normal in caliber. No evidence of stenosis, occlusion or saccular aneurysm dilation. The vertebral arteries are codominant.      IMPRESSION:    Evolving hypodensity/edema in the right parietal lobe suggesting subacute infarct. Possible underlying space occupying lesion measuring 1.4 cm. Contrast-enhanced MRI of the brain is recommended    No large vessel occlusion. No evidence of intracranial arterial aneurysm or AVM.    Moderate stenosis of the right MCA M1 segmenta and mild stenosis of the left MCA M1 segment.    Atherosclerotic disease of the PCAs    No evidence of dural sinus thrombosis.    Preliminary report provided by ADELFO BHARDWAJ DO; Attending Radiologist.    --- End of Report ---    NAVYA FAULKNER MD; Attending Radiologist  This document has been electronically signed. Juan 15 2023 8:31AM    A/P : 63 year old male on daily Tumeric with 1 week of progressively worsening Headaches found to have Atraumatic Right Occipital SAH on outside MRI/ MRA sent to [facility]  for further work up   CT Brain Demonstrated hypodensity in the right parietal lobe suggesting subacute infarct.  CTA Brain & CTA Neck performed no AVM , no Aneurysm , No LVO    Awaiting Follow up MRI Brain   Pt continues to require PRN Hydralazine for elevated BP   Amlodipine 5 mg started daily for HTN   Lovenox 40 mg SQ for DVT prophylaxis   Plan to follow   d/w Dr Faria

## 2023-06-18 ENCOUNTER — TRANSCRIPTION ENCOUNTER (OUTPATIENT)
Age: 63
End: 2023-06-18

## 2023-06-18 VITALS
SYSTOLIC BLOOD PRESSURE: 150 MMHG | DIASTOLIC BLOOD PRESSURE: 98 MMHG | RESPIRATION RATE: 18 BRPM | TEMPERATURE: 97 F | HEART RATE: 100 BPM | OXYGEN SATURATION: 98 %

## 2023-06-18 PROCEDURE — 85027 COMPLETE CBC AUTOMATED: CPT

## 2023-06-18 PROCEDURE — 36226 PLACE CATH VERTEBRAL ART: CPT

## 2023-06-18 PROCEDURE — 83497 ASSAY OF 5-HIAA: CPT

## 2023-06-18 PROCEDURE — 76377 3D RENDER W/INTRP POSTPROCES: CPT

## 2023-06-18 PROCEDURE — 93970 EXTREMITY STUDY: CPT

## 2023-06-18 PROCEDURE — 87641 MR-STAPH DNA AMP PROBE: CPT

## 2023-06-18 PROCEDURE — 36415 COLL VENOUS BLD VENIPUNCTURE: CPT

## 2023-06-18 PROCEDURE — 76775 US EXAM ABDO BACK WALL LIM: CPT

## 2023-06-18 PROCEDURE — 84100 ASSAY OF PHOSPHORUS: CPT

## 2023-06-18 PROCEDURE — 99238 HOSP IP/OBS DSCHRG MGMT 30/<: CPT

## 2023-06-18 PROCEDURE — 86901 BLOOD TYPING SEROLOGIC RH(D): CPT

## 2023-06-18 PROCEDURE — 87640 STAPH A DNA AMP PROBE: CPT

## 2023-06-18 PROCEDURE — 83735 ASSAY OF MAGNESIUM: CPT

## 2023-06-18 PROCEDURE — C1894: CPT

## 2023-06-18 PROCEDURE — 80053 COMPREHEN METABOLIC PANEL: CPT

## 2023-06-18 PROCEDURE — 36224 PLACE CATH CAROTD ART: CPT

## 2023-06-18 PROCEDURE — 80061 LIPID PANEL: CPT

## 2023-06-18 PROCEDURE — 99285 EMERGENCY DEPT VISIT HI MDM: CPT

## 2023-06-18 PROCEDURE — 84443 ASSAY THYROID STIM HORMONE: CPT

## 2023-06-18 PROCEDURE — 86803 HEPATITIS C AB TEST: CPT

## 2023-06-18 PROCEDURE — 85610 PROTHROMBIN TIME: CPT

## 2023-06-18 PROCEDURE — 96375 TX/PRO/DX INJ NEW DRUG ADDON: CPT

## 2023-06-18 PROCEDURE — 85025 COMPLETE CBC W/AUTO DIFF WBC: CPT

## 2023-06-18 PROCEDURE — 70553 MRI BRAIN STEM W/O & W/DYE: CPT

## 2023-06-18 PROCEDURE — 36227 PLACE CATH XTRNL CAROTID: CPT

## 2023-06-18 PROCEDURE — 93306 TTE W/DOPPLER COMPLETE: CPT

## 2023-06-18 PROCEDURE — 99222 1ST HOSP IP/OBS MODERATE 55: CPT

## 2023-06-18 PROCEDURE — 70496 CT ANGIOGRAPHY HEAD: CPT

## 2023-06-18 PROCEDURE — 81003 URINALYSIS AUTO W/O SCOPE: CPT

## 2023-06-18 PROCEDURE — 86900 BLOOD TYPING SEROLOGIC ABO: CPT

## 2023-06-18 PROCEDURE — 84300 ASSAY OF URINE SODIUM: CPT

## 2023-06-18 PROCEDURE — 70498 CT ANGIOGRAPHY NECK: CPT | Mod: MA

## 2023-06-18 PROCEDURE — C1887: CPT

## 2023-06-18 PROCEDURE — 93005 ELECTROCARDIOGRAM TRACING: CPT

## 2023-06-18 PROCEDURE — 96365 THER/PROPH/DIAG IV INF INIT: CPT

## 2023-06-18 PROCEDURE — C1769: CPT

## 2023-06-18 PROCEDURE — 86850 RBC ANTIBODY SCREEN: CPT

## 2023-06-18 PROCEDURE — 82570 ASSAY OF URINE CREATININE: CPT

## 2023-06-18 PROCEDURE — 71045 X-RAY EXAM CHEST 1 VIEW: CPT

## 2023-06-18 PROCEDURE — 84540 ASSAY OF URINE/UREA-N: CPT

## 2023-06-18 PROCEDURE — 83036 HEMOGLOBIN GLYCOSYLATED A1C: CPT

## 2023-06-18 PROCEDURE — 70450 CT HEAD/BRAIN W/O DYE: CPT | Mod: MA

## 2023-06-18 PROCEDURE — 80048 BASIC METABOLIC PNL TOTAL CA: CPT

## 2023-06-18 PROCEDURE — 83935 ASSAY OF URINE OSMOLALITY: CPT

## 2023-06-18 PROCEDURE — 85730 THROMBOPLASTIN TIME PARTIAL: CPT

## 2023-06-18 RX ORDER — MUPIROCIN 20 MG/G
1 OINTMENT TOPICAL
Qty: 0 | Refills: 0 | DISCHARGE
Start: 2023-06-18 | End: 2023-06-20

## 2023-06-18 RX ORDER — LEVETIRACETAM 250 MG/1
1 TABLET, FILM COATED ORAL
Qty: 30 | Refills: 0
Start: 2023-06-18

## 2023-06-18 RX ORDER — LEVETIRACETAM 250 MG/1
500 TABLET, FILM COATED ORAL
Refills: 0 | Status: DISCONTINUED | OUTPATIENT
Start: 2023-06-18 | End: 2023-06-18

## 2023-06-18 RX ORDER — AMLODIPINE BESYLATE 2.5 MG/1
1 TABLET ORAL
Qty: 30 | Refills: 0
Start: 2023-06-18

## 2023-06-18 RX ORDER — ATORVASTATIN CALCIUM 80 MG/1
1 TABLET, FILM COATED ORAL
Qty: 30 | Refills: 0
Start: 2023-06-18

## 2023-06-18 RX ORDER — ATORVASTATIN CALCIUM 80 MG/1
80 TABLET, FILM COATED ORAL AT BEDTIME
Refills: 0 | Status: DISCONTINUED | OUTPATIENT
Start: 2023-06-18 | End: 2023-06-18

## 2023-06-18 RX ORDER — MUPIROCIN 20 MG/G
1 OINTMENT TOPICAL
Qty: 0 | Refills: 0
Start: 2023-06-18

## 2023-06-18 RX ORDER — MUPIROCIN 20 MG/G
1 OINTMENT TOPICAL
Qty: 1 | Refills: 0
Start: 2023-06-18 | End: 2023-06-19

## 2023-06-18 RX ADMIN — Medication 650 MILLIGRAM(S): at 04:55

## 2023-06-18 RX ADMIN — ENOXAPARIN SODIUM 40 MILLIGRAM(S): 100 INJECTION SUBCUTANEOUS at 04:55

## 2023-06-18 RX ADMIN — LEVETIRACETAM 400 MILLIGRAM(S): 250 TABLET, FILM COATED ORAL at 04:55

## 2023-06-18 RX ADMIN — AMLODIPINE BESYLATE 5 MILLIGRAM(S): 2.5 TABLET ORAL at 04:55

## 2023-06-18 RX ADMIN — MUPIROCIN 1 APPLICATION(S): 20 OINTMENT TOPICAL at 04:55

## 2023-06-18 NOTE — DISCHARGE NOTE PROVIDER - PROVIDER TOKENS
PROVIDER:[TOKEN:[[id number]],FOLLOWUP:[2 weeks]] PROVIDER:[TOKEN:[[id number]],FOLLOWUP:[2 weeks]] PROVIDER:[TOKEN:[[id number]],FOLLOWUP:[2 weeks]]

## 2023-06-18 NOTE — DISCHARGE NOTE PROVIDER - NSDCMRMEDTOKEN_GEN_ALL_CORE_FT
amLODIPine 5 mg oral tablet: 1 tab(s) orally once a day  atorvastatin 80 mg oral tablet: 1 tab(s) orally once a day (at bedtime) MDD: 1  levETIRAcetam 500 mg oral tablet: 1 tab(s) orally 2 times a day MDD: 2  mupirocin 2% topical cream: Apply topically to affected area 2 times a day Please put a small sized amount of cream on a cotton swab and apply intranasally to both nostrils 2 times a day.

## 2023-06-18 NOTE — DISCHARGE NOTE PROVIDER - ATTENDING ATTESTATION STATEMENT
I have personally seen and examined the patient. I have collaborated with and supervised the
severe chronic

## 2023-06-18 NOTE — DISCHARGE NOTE PROVIDER - HOSPITAL COURSE
63 year old male, no PMH, had been having one week of headaches, worse in supine position. PCP (his daughter) sent him for an MRI, MRA with contrast for a headache work up which revealed a right occipital region hemorrhage with surrounding vasogenic edema and subarachnoid blood. Pt directed to ED for further eval. ED got CT scan that shows right parietal SAH.     On admission: HH1, MF 0. MRS 0     6/15: Had CTA read as possible space occupying lesion vs subacute infarct. Also noted is diffuse atherosclerotic disease with moderate stenosis of right M1. No aneurysm or AVM, no dural sinus thrombosis.    6/15: Had Diagnostic Cerebral Angiogram which is negative for vascular abnormality, diffuse atherosclerosis.     LBM 6/16

## 2023-06-18 NOTE — DISCHARGE NOTE PROVIDER - NSFOLLOWUPCLINICS_GEN_ALL_ED_FT
[facility]  Nephrology  [address] [place] [address], [address] [place] [address]  Phone: [phone]  Fax:      [facility]  Nephrology  [address] [place] [address], [address] [place] [address]  Phone: [phone]  Fax:      [facility]  Nephrology  [address] [place] [address], [address]   [address]  Phone: [phone]  Fax:

## 2023-06-18 NOTE — DISCHARGE NOTE PROVIDER - CARE PROVIDER_API CALL
Gerard Faria  Neurosurgery  [address] [place] [id number]  Phone: [phone]  Fax: [phone]  Follow Up Time: 2 weeks   Gerard Faria  Neurosurgery  [address] [place] [id number]  Phone: [phone]  Fax: [phone]  Follow Up Time: 2 weeks   Gerard Faria  Neurosurgery  [address] [place] [id number]  Phone: [phone]  Fax: [phone]  Follow Up Time: 2 weeks

## 2023-06-18 NOTE — CONSULT NOTE ADULT - SUBJECTIVE AND OBJECTIVE BOX
62 y/o male with no significant medical problem, he went to see his PCP for week  hx of headaches, worse in supine position, sent him for an MRI, MRA with contrast for a headache work up which revealed a right occipital region hemorrhage with surrounding vasogenic edema and subarachnoid blood, directed to ED for further eval. ED got CT scan that shows right parietal SAH, he was admitted to Neuro ICU, Neuro checks done, he was noted to have elevated BP, was started on Larissa drip, BP monitored closely and has been weaned off from the drip and was given Norvasc 5mg and some PRN hydralazine and labtelalol, blood pressure has been stabilizing, he was give seizure medication for prevention, his repeat imaging done 6/15: Had CTA read as possible space occupying lesion vs subacute infarct. Also noted is diffuse atherosclerotic disease with moderate stenosis of right M1. No aneurysm or AVM, no dural sinus thrombosis, 6/15: Had Diagnostic Cerebral Angiogram which is negative for vascular abnormality, diffuse atherosclerosis, patient's headache is better, he denies any weakness, numbness, denies fever, chills, chest pain, nausea, vomiting.       Allergies:  	No Known Allergies:     Home Medications:   none       Social History: Not a smoker, drinker or using any drugs       Vital Signs Last 24 Hrs  T(C): 36.4 (18 Jun 2023 08:19), Max: 36.5 (18 Jun 2023 00:39)  T(F): 97.6 (18 Jun 2023 08:19), Max: 97.7 (18 Jun 2023 00:39)  HR: 77 (18 Jun 2023 08:19) (62 - 98)  BP: 131/85 (18 Jun 2023 08:19) (128/77 - 164/83)  BP(mean): 106 (17 Jun 2023 19:30) (106 - 110)  RR: 16 (18 Jun 2023 08:19) (16 - 18)  SpO2: 97% (18 Jun 2023 08:19) (96% - 98%)    Parameters below as of 18 Jun 2023 08:19  Patient On (Oxygen Delivery Method): room air        PHYSICAL EXAM:    GENERAL: Middle age male looking comfortable   HEENT: PERRL, +EOMI  NECK: soft, Supple, No JVD  CHEST/LUNG: Clear to auscultate bilaterally; No wheezing  HEART: S1S2+, Regular rate and rhythm; No murmurs  ABDOMEN: Soft, Nontender, Nondistended; Bowel sounds present  EXTREMITIES:  1+ Peripheral Pulses, No edema  SKIN: No rashes or lesions  NEURO: AAOX3  PSYCH: normal mood      LABS:                        15.8   6.62  )-----------( 201      ( 17 Jun 2023 04:27 )             46.0     06-17    141  |  105  |  20.6<H>  ----------------------------<  109<H>  4.4   |  24.0  |  1.47<H>    Ca    9.2      17 Jun 2023 04:27  Phos  3.5     06-17  Mg     2.0     06-17              I&O's Summary    17 Jun 2023 07:01  -  18 Jun 2023 07:00  --------------------------------------------------------  IN: 1400 mL / OUT: 0 mL / NET: 1400 mL        MEDICATIONS  (STANDING):  amLODIPine   Tablet 5 milliGRAM(s) Oral daily  chlorhexidine 2% Cloths 1 Application(s) Topical daily  enoxaparin Injectable 40 milliGRAM(s) SubCutaneous every 24 hours  levETIRAcetam  IVPB 500 milliGRAM(s) IV Intermittent every 12 hours  mupirocin 2% Nasal 1 Application(s) Both Nostrils two times a day    MEDICATIONS  (PRN):  acetaminophen     Tablet .. 650 milliGRAM(s) Oral every 6 hours PRN Temp greater or equal to 38C (100.4F), Mild Pain (1 - 3)  hydrALAZINE Injectable 10 milliGRAM(s) IV Push every 2 hours PRN SBP > 160  labetalol Injectable 10 milliGRAM(s) IV Push every 2 hours PRN SBP > 140  labetalol Injectable 10 milliGRAM(s) IV Push every 2 hours PRN SBP > 160

## 2023-06-18 NOTE — DISCHARGE NOTE PROVIDER - NSDCCPCAREPLAN_GEN_ALL_CORE_FT
PRINCIPAL DISCHARGE DIAGNOSIS  Diagnosis: Subarachnoid hemorrhage  Assessment and Plan of Treatment:

## 2023-06-18 NOTE — DISCHARGE NOTE PROVIDER - NSDCFUADDAPPT_GEN_ALL_CORE_FT
Please follow up with primary care doctor in 1 week to discuss recent hospitalization including: elevated LDLs- started on Atorvastatin,   HTN- started on Norvasc,  Kidney disease. May follow up with Nephrologist as soon as discharged to discuss further management.    May start Aspirin 81 in 1 week (6/25/2023) for atherosclerotic disease. Recommended for low salt diet.     Continue Keppra for 2 more weeks and follow up with Dr. Faria in 2 weeks.     PT/OT evaluated, no restrictions.     For any new or worsening symptoms including but not limited too, worsening HA not relieved w/ Tylenol, numbness, tingling, visual disturbances, please call office or return to ER if severe.      Please follow up with primary care doctor in 1 week to discuss recent hospitalization including: elevated LDLs- started on Atorvastatin,   HTN- started on Norvasc,  Kidney disease. May follow up with Nephrologist as soon as discharged to discuss further management.    May start Aspirin 81 in 1 week (6/25/2023) for atherosclerotic disease. Recommended for low salt diet.     Continue Keppra for 2 more weeks and follow up with Dr. Faria in 2 weeks.       Continue Bactroban for 2 more days. Applying small amount of ointment onto cotton swab and applying intranasally to both nostrils twice a day.    PT/OT evaluated, no restrictions.     For any new or worsening symptoms including but not limited too, worsening HA not relieved w/ Tylenol, numbness, tingling, visual disturbances, please call office or return to ER if severe.

## 2023-06-18 NOTE — CONSULT NOTE ADULT - ASSESSMENT
64 y/o male with no significant medical problem, he went to see his PCP for week  hx of headaches, worse in supine position, sent him for an MRI, MRA with contrast for a headache work up which revealed a right occipital region hemorrhage with surrounding vasogenic edema and subarachnoid blood, directed to ED for further eval. ED got CT scan that shows right parietal SAH, he was admitted to Neuro ICU, Neuro checks done, he was noted to have elevated BP, was started on Larissa drip, BP monitored closely and has been weaned off from the drip and was given Norvasc 5mg and some PRN hydralazine and labtelalol, blood pressure has been stabilizing, he was give seizure medication for prevention, his repeat imaging done 6/15: Had CTA read as possible space occupying lesion vs subacute infarct. Also noted is diffuse atherosclerotic disease with moderate stenosis of right M1. No aneurysm or AVM, no dural sinus thrombosis, 6/15: Had Diagnostic Cerebral Angiogram which is negative for vascular abnormality, diffuse atherosclerosis, patient's headache is better, TTE is done as well as CT angio neck, medicine team consulted for management of HTN.     Plan    ICH:   Plan as per Neurosurgery team   On [facility]  Neuro check   MRI pending       HTN: patient BP is looking in acceptable, would continue with Norvasc 5mg daily and will PRN meds   he was counselled for low salt diet and checking BP daily and follow with PMD in 1 week to get his BP checked and medication adjusted     HLD: patient LDL is 113, given hemorrhagic stroke and arthrosclerotic disease, would recommend  Atorvastatin 80mg daily     CKD: patient creatinine is noted to be 1.5 on admission and repeat one, looks like have CKD, would recommend follow up with Nephrology as out patient.     Discussed with Neurosurgery team as well as patient  62 y/o male with no significant medical problem, he went to see his PCP for week  hx of headaches, worse in supine position, sent him for an MRI, MRA with contrast for a headache work up which revealed a right occipital region hemorrhage with surrounding vasogenic edema and subarachnoid blood, directed to ED for further eval. ED got CT scan that shows right parietal SAH, he was admitted to Neuro ICU, Neuro checks done, he was noted to have elevated BP, was started on Larissa drip, BP monitored closely and has been weaned off from the drip and was given Norvasc 5mg and some PRN hydralazine and labtelalol, blood pressure has been stabilizing, he was give seizure medication for prevention, his repeat imaging done 6/15: Had CTA read as possible space occupying lesion vs subacute infarct. Also noted is diffuse atherosclerotic disease with moderate stenosis of right M1. No aneurysm or AVM, no dural sinus thrombosis, 6/15: Had Diagnostic Cerebral Angiogram which is negative for vascular abnormality, diffuse atherosclerosis, patient's headache is better, TTE is done as well as CT angio neck, medicine team consulted for management of HTN.     Plan    ICH:   Plan as per Neurosurgery team   On [facility]  Neuro check   MRI pending       HTN: patient BP is looking in acceptable, would continue with Norvasc 5mg daily and will PRN meds   he was counselled for low salt diet and checking BP daily and follow with PMD in 1 week to get his BP checked and medication adjusted     HLD: patient LDL is 113, given hemorrhagic stroke and arthrosclerotic disease, would recommend  Atorvastatin 80mg daily     CKD: patient creatinine is noted to be 1.5 on admission and repeat one, looks like have CKD, would recommend follow up with Nephrology as out patient.     Discussed with Neurosurgery team as well as patient  62 y/o male with no significant medical problem, he went to see his PCP for week  hx of headaches, worse in supine position, sent him for an MRI, MRA with contrast for a headache work up which revealed a right occipital region hemorrhage with surrounding vasogenic edema and subarachnoid blood, directed to ED for further eval. ED got CT scan that shows right parietal SAH, he was admitted to Neuro ICU, Neuro checks done, he was noted to have elevated BP, was started on Larissa drip, BP monitored closely and has been weaned off from the drip and was given Norvasc 5mg and some PRN hydralazine and labtelalol, blood pressure has been stabilizing, he was give seizure medication for prevention, his repeat imaging done 6/15: Had CTA read as possible space occupying lesion vs subacute infarct. Also noted is diffuse atherosclerotic disease with moderate stenosis of right M1. No aneurysm or AVM, no dural sinus thrombosis, 6/15: Had Diagnostic Cerebral Angiogram which is negative for vascular abnormality, diffuse atherosclerosis, patient's headache is better, TTE is done as well as CT angio neck, medicine team consulted for management of HTN.     Plan    ICH:   Plan as per Neurosurgery team   On [facility]  Neuro check   MRI pending       HTN: patient BP is looking in acceptable, would continue with Norvasc 5mg daily and will PRN meds   he was counselled for low salt diet and checking BP daily and follow with PMD in 1 week to get his BP checked and medication adjusted     HLD: patient LDL is 113, given hemorrhagic stroke and arthrosclerotic disease, would recommend  Atorvastatin 80mg daily     CKD: patient creatinine is noted to be 1.5 on admission and repeat one, looks like have CKD, would recommend follow up with Nephrology as out patient.     Discussed with Neurosurgery team as well as patient

## 2023-06-21 ENCOUNTER — APPOINTMENT (OUTPATIENT)
Dept: NEUROLOGY | Facility: CLINIC | Age: 63
End: 2023-06-21
Payer: MEDICARE

## 2023-06-21 VITALS
DIASTOLIC BLOOD PRESSURE: 80 MMHG | BODY MASS INDEX: 27.29 KG/M2 | WEIGHT: 154 LBS | SYSTOLIC BLOOD PRESSURE: 138 MMHG | RESPIRATION RATE: 14 BRPM | OXYGEN SATURATION: 99 % | TEMPERATURE: 97.7 F | HEIGHT: 63 IN | HEART RATE: 66 BPM

## 2023-06-21 DIAGNOSIS — I10 ESSENTIAL (PRIMARY) HYPERTENSION: ICD-10-CM

## 2023-06-21 DIAGNOSIS — I61.1 NONTRAUMATIC INTRACEREBRAL HEMORRHAGE IN HEMISPHERE, CORTICAL: ICD-10-CM

## 2023-06-21 PROCEDURE — 99215 OFFICE O/P EST HI 40 MIN: CPT

## 2023-06-21 NOTE — PHYSICAL EXAM
[FreeTextEntry1] : GENERAL APPEARANCE:\par Well developed, well nourished man in no acute distress.\par CARDIOVASCULAR:\par Regular rate and rhythm \par \par NEUROLOGIC EXAM:\par MENTAL STATUS:\par Alert and Oriented to person, place and time.\par Speech is fluent, without aphasia or dysarthria\par Behavior and affect appropriate to situation.                        \par CRANIAL NERVES:\par CN 2:    Visual fields are full to confrontation.\par CN 3, 4, 6: Extraocular movements are intact. No nystagmus or ophthalmoplegia is evident. Pupils are equally round and reactive to light.\par CN 5:     Facial sensation is intact to light touch in all 3 divisions\par CN 7:     Facial excursion is full and symmetric bilaterally.\par MOTOR:\par Strength is 5/5 throughout for age and stature.\par No orbiting or drift noted.\par SENSORY:\par Intact to light touch perception in all four extremities without extinction to double simultaneous stimulation\par COORD:\par Finger to nose testing without dysmetria bilaterally.\par GAIT:\par Normal station and gait. \par \par

## 2023-06-21 NOTE — HISTORY OF PRESENT ILLNESS
[FreeTextEntry1] : Since hospitalization patient has recovered very well.  He has been checking his blood pressure at home and it has been in the 140s.  His daughter is a healthcare ACP and will be his primary care physician.  He will be seeing her after my appointment today.\par \par He denies any headache, visual disturbance, seizure, loss of consciousness or awareness.  He is functionally independent without any residual deficits.  He is able to ambulate and drive independently without issues.\par \par

## 2023-06-21 NOTE — DISCUSSION/SUMMARY
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  CT angiogram of the head and neck was unremarkable.  Patient underwent a conventional digital subtraction angiogram that did not reveal any underlying cerebral arteriovenous malformation or dural arteriovenous fistula or aneurysm.  MRI of the head with and without contrast did not demonstrate any clear underlying neoplasm.\par \par Blood pressure is fairly controlled.  I discussed with his daughter, Valeyessicajet MedinaNEHAL grant (his PCP) and she will be increasing his amlodipine from 5 mg daily to 10 mg daily today.  They will be doing a complete physical and general medical evaluation today.\par \par I recommended follow-up MRI head with and without contrast within the next couple of months.  And I recommended follow-up cerebral angiogram post MRI completion.\par \par For now, patient has recovered very well without any deficits.\par \par Recommendation:\par 1.  Normotension, resting goal blood pressure is less than 130/80\par 2.  MRI head with and without contrast late August 2023\par 3.  Pending MRI result, plan for follow-up cerebral angiogram sometime in September 2023.\par 4.  Healthy living including diet, exercise and healthy habits\par \par \par \par \par Follow-up in 2 months. \par \par

## 2023-06-25 ENCOUNTER — TRANSCRIPTION ENCOUNTER (OUTPATIENT)
Age: 63
End: 2023-06-25

## 2023-06-27 LAB
5OH-INDOLEACETATE UR-MCNC: 3 MG/G CREAT — SIGNIFICANT CHANGE UP
CREAT ?TM UR-MCNC: 158 MG/DL — SIGNIFICANT CHANGE UP (ref 20–320)

## 2023-12-09 NOTE — PHYSICAL THERAPY INITIAL EVALUATION ADULT - LEVEL OF INDEPENDENCE: SIT/SUPINE, REHAB EVAL
Treatment may include any combination of the following: Strengthening, ROM, Balance training, Functional mobility training, Endurance training, Gait training, Home exercise program, Patient/Caregiver education & training, Aquatics     Frequency / Duration:  Patient to be seen 2 times per week for 4-6 weeks weeks      Eval Complexity: Overall Evaluation : Low  Decision Making: Low Complexity  Examination of body system(s) including body structures and functions, activity limitations, and/or participation restrictions: Low  Clinical Presentation: Low    PT Treatment Completed:  N/A - Evaluation Only    Therapy Time  Individual Time In:         Individual Time Out:    Minutes: Therapist Signature: Julián Eden PT    Date: 55/83/79     I certify that the above Therapy Services are being furnished while the patient is under my care. I agree with the treatment plan and certify that this therapy is necessary. Physician's Signature:  ___________________________   Date:_______                                                                   Gabriel Kulkarni DO        Physician Comments: _______________________________________________    Please sign and return to [facility] PHYSICAL [facility]ERAP[facility]. Please fax to the location listed below.  [facility] for this referral!    [address] [facility]  [address] [place] [address]  Dept: [phone]  Fax: [phone]       POC NOTE
independent

## 2024-01-02 ENCOUNTER — APPOINTMENT (OUTPATIENT)
Dept: NEUROLOGY | Facility: CLINIC | Age: 64
End: 2024-01-02